# Patient Record
Sex: MALE | Race: WHITE | Employment: OTHER | URBAN - METROPOLITAN AREA
[De-identification: names, ages, dates, MRNs, and addresses within clinical notes are randomized per-mention and may not be internally consistent; named-entity substitution may affect disease eponyms.]

---

## 2017-02-07 ENCOUNTER — OFFICE VISIT (OUTPATIENT)
Dept: FAMILY MEDICINE CLINIC | Age: 77
End: 2017-02-07

## 2017-02-07 VITALS
BODY MASS INDEX: 31.07 KG/M2 | WEIGHT: 205 LBS | DIASTOLIC BLOOD PRESSURE: 75 MMHG | HEIGHT: 68 IN | SYSTOLIC BLOOD PRESSURE: 117 MMHG | HEART RATE: 52 BPM | OXYGEN SATURATION: 97 % | TEMPERATURE: 97.6 F | RESPIRATION RATE: 18 BRPM

## 2017-02-07 DIAGNOSIS — Z13.31 DEPRESSION SCREEN: ICD-10-CM

## 2017-02-07 DIAGNOSIS — G47.33 OSA (OBSTRUCTIVE SLEEP APNEA): ICD-10-CM

## 2017-02-07 DIAGNOSIS — Z00.00 HEALTHCARE MAINTENANCE: Primary | ICD-10-CM

## 2017-02-07 DIAGNOSIS — Z13.220 LIPID SCREENING: ICD-10-CM

## 2017-02-07 DIAGNOSIS — E03.9 HYPOTHYROIDISM, UNSPECIFIED TYPE: ICD-10-CM

## 2017-02-07 DIAGNOSIS — R35.0 FREQUENT URINATION: ICD-10-CM

## 2017-02-07 DIAGNOSIS — Z13.5 GLAUCOMA SCREENING: ICD-10-CM

## 2017-02-07 DIAGNOSIS — Z23 NEED FOR ZOSTAVAX ADMINISTRATION: ICD-10-CM

## 2017-02-07 DIAGNOSIS — N41.9 PROSTATITIS, UNSPECIFIED PROSTATITIS TYPE: ICD-10-CM

## 2017-02-07 PROBLEM — Z98.890 H/O COLONOSCOPY: Status: ACTIVE | Noted: 2017-02-07

## 2017-02-07 LAB
BILIRUB UR QL STRIP: NEGATIVE
GLUCOSE UR-MCNC: NEGATIVE MG/DL
KETONES P FAST UR STRIP-MCNC: NEGATIVE MG/DL
PH UR STRIP: 6.5 [PH] (ref 4.6–8)
PROT UR QL STRIP: NEGATIVE MG/DL
SP GR UR STRIP: 1.02 (ref 1–1.03)
UA UROBILINOGEN AMB POC: NORMAL (ref 0.2–1)
URINALYSIS CLARITY POC: CLEAR
URINALYSIS COLOR POC: YELLOW
URINE BLOOD POC: NEGATIVE
URINE LEUKOCYTES POC: NEGATIVE
URINE NITRITES POC: NEGATIVE

## 2017-02-07 RX ORDER — BISMUTH SUBSALICYLATE 262 MG
1 TABLET,CHEWABLE ORAL DAILY
COMMUNITY
End: 2022-03-22

## 2017-02-07 RX ORDER — ASCORBIC ACID 250 MG
TABLET ORAL
COMMUNITY
End: 2022-03-22

## 2017-02-07 RX ORDER — TAMSULOSIN HYDROCHLORIDE 0.4 MG/1
0.4 CAPSULE ORAL DAILY
Qty: 30 CAP | Refills: 1 | Status: SHIPPED | OUTPATIENT
Start: 2017-02-07 | End: 2017-06-05 | Stop reason: SDUPTHER

## 2017-02-07 RX ORDER — DOXYCYCLINE 100 MG/1
100 TABLET ORAL DAILY
Qty: 14 TAB | Refills: 1 | Status: SHIPPED | OUTPATIENT
Start: 2017-02-07 | End: 2017-02-21

## 2017-02-07 NOTE — PROGRESS NOTES
Doug Ramirez is a 78 y.o. male      Issues discussed today include:        Signs and symptoms:  Nocturia/polyuria. Decreased FOS  Duration:  days  Context:  Known BPH  Location:    Quality:  Sounds like prostatitis  Severity:  No fevers  Timing:  daily  Modifying factors:  Rx Flomax/doxy    Data reviewed or ordered today:  Labs    WELLNESS     PSA:  2017  AAA screen:   Normal     Hearing screen:   done  Vision screening:   done  Depression screening:   done  Fall assessment:   done    Colonoscopy:    FOBT:    TDAP:  2014  Pneumovax:    PCV-13:  Rx   Flu shot:  2017  Zostavax:  Rx 2017  Eye exam:  Needs 2017  EK    FTF for DME:  done:  CPAP (needs update)  Advanced directive:  Full code  Specialists:  [facility]    In general, I advise patients to be as active as possible. I believe exercise is the key to long life and good health. The current recommendation is for individuals to exercise for 150 minutes each week (in other words 30 minutes 5 days a week). Exercise should be vigorous enough to work up a sweat. These activities include brisk walking, running, tennis, swimming, weight-lifting, etc.     I usually tell folks that work is work and exercise is exercise. Each of these activities has a different goal.  Even though you may be active at work, it may not be aerobically adequate. So build dedicated exercise time into your weekly routine. If a patient drinks alcohol, I suggest that a male drink only 2 beers (or glasses of wine, or shots of liquor) in any 24 hour period ( and not daily). For females, the limits are one drink per 24 hours (and not daily). After these limits, the toxic effects of alcohol consumption start to manifest.     Avoid tobacco products. I may provide separate information discussing specific smoking cessation instructions if needed.       I suggest a wellness exam yearly during your birth month to update health maintenance issues such as fasting labs, EKGs and other studies, appropriate cancer screenings,  immunizations, etc.      I suggest a yearly flu shot    Please call Joana Martin to help arrange and authorize any tests or referrals. Her # is [phone]         Other problems include:  Patient Active Problem List   Diagnosis Code    BPH (benign prostatic hypertrophy) N40.0    Hypothyroidism E03.9    Seizure ([facility] [address] R56.9    Meningioma ([facility] [address] D32.9    RACHEL (obstructive sleep apnea) G47.33    H/O colonoscopy Z98.890       Medications:  Current Outpatient Prescriptions   Medication Sig Dispense Refill    ascorbic acid, vitamin C, (VITAMIN C) 250 mg tablet Take  by mouth.  multivitamin (ONE A DAY) tablet Take 1 Tab by mouth daily.  Omega-3 Fatty Acids 60- mg cpDR Take  by mouth.  tamsulosin (FLOMAX) 0.4 mg capsule Take 1 Cap by mouth daily. 30 Cap 1    doxycycline (ADOXA) 100 mg tablet Take 1 Tab by mouth daily for 14 days. 14 Tab 1    clotrimazole-betamethasone (LOTRISONE) topical cream Apply twice daily for 2 weeks 45 g 1    levothyroxine (SYNTHROID) 50 mcg tablet TAKE 1 TABLET BY MOUTH EVERY MORNING AT 7AM 30 Tab 5       Allergies: Allergies   Allergen Reactions    Bactrim [Sulfamethoprim Ds] Seizures    Sulfa (Sulfonamide Antibiotics) Other (comments)     Not sure what happens was taking during time of seizure and other brothers are allergic. LMP:  No LMP for male patient. Social History     Social History    Marital status:      Spouse name: N/A    Number of children: N/A    Years of education: N/A     Occupational History    Not on file.      Social History Main Topics    Smoking status: Former Smoker    Smokeless tobacco: Never Used    Alcohol use No    Drug use: No    Sexual activity: Yes     Partners: Female     Other Topics Concern    Not on file     Social History Narrative         Family History   Problem Relation Age of Onset    Heart Disease Mother     Heart Disease Father Meaningful use:  done      ROS:  Headaches:  no  Chest Pain:  no  SOB:  no  Fevers:  no  Other significant ROS:  Active, no falls/depression, no GI issues, prostatism    No LMP for male patient. Physical Exam  Visit Vitals    /75 (BP 1 Location: Left arm, BP Patient Position: Sitting)    Pulse (!) 52    Temp 97.6 °F (36.4 °C) (Oral)    Resp 18    Ht 5' 8\" (1.727 m)    Wt 205 lb (93 kg)    SpO2 97%    BMI 31.17 kg/m2     BP Readings from Last 3 Encounters:   02/07/17 117/75   09/23/15 120/79   04/30/15 132/85     Constitutional:  Appears well,  No Acute Distress, Vitals noted  Psychiatric:   Affect normal, Alert and cooperative, Oriented to person/place/time    Eyes:   Pupils equally round and reactive, EOMI, conjunctiva clear, eyelids normal  ENT:   External ears and nose normal/lips, teeth=OK/gums normal, TMs and Orophyarynx normal  Neck:   general inspection and Thyroid normal.  No abnormal cervical or supraclavicular nodes    Lungs:   clear to auscultation, good respiratory effort  Heart: Ausculation normal.  Regular rhythm. No cardiac murmurs. No carotid bruits or palpable thrills  Chest wall normal  Abd:  benign  Extremities:   without edema, good peripheral pulses  Skin:   Warm to palpation, without rashes, bruising, or suspicious lesions           Assessment:    Patient Active Problem List   Diagnosis Code    BPH (benign prostatic hypertrophy) N40.0    Hypothyroidism E03.9    Seizure (HCC) R56.9    Meningioma (HCC) D32.9    RACHEL (obstructive sleep apnea) G47.33    H/O colonoscopy Z98.890       Today's diagnoses are:    ICD-10-CM ICD-9-CM    1. Healthcare maintenance Z00.00 V70.0 INFLUENZA VIRUS VAC QUAD,SPLIT,PRESV FREE SYRINGE 3/> YRS IM      [place] IMMUNIZ ADMIN,1 SINGLE/COMB VAC/TOXOID   2. Frequent urination R35.0 788.41 AMB POC URINALYSIS DIP STICK AUTO W/O MICRO      CULTURE, URINE   3. Depression screen Z13.89 V79.0 [place] DEPRESSION SCREEN ANNUAL   4.  Prostatitis, unspecified prostatitis type N41.9 601.9 PSA DIAGNOSTIC (PROSTATIC SPECIFIC AG)      CBC W/O DIFF      METABOLIC PANEL, COMPREHENSIVE      tamsulosin (FLOMAX) 0.4 mg capsule      doxycycline (ADOXA) 100 mg tablet   5. RACHEL (obstructive sleep apnea) G47.33 327.23    6. Hypothyroidism, unspecified type E03.9 244.9 TSH 3RD GENERATION    he is NOT on Rx   7. Lipid screening Z13.220 V77.91 CHOLESTEROL, HDL      CHOLESTEROL, TOTAL      LDL, DIRECT   8. Need for Zostavax administration Z23 V04.89     rx given for PCV 13 and zostavax   9. Glaucoma screening Z13.5 V80.1 REFERRAL TO OPHTHALMOLOGY       Plan:  Orders Placed This Encounter    [place] IMMUNIZ ADMIN,1 SINGLE/COMB VAC/TOXOID    CULTURE, URINE    INFLUENZA VIRUS VAC QUAD,SPLIT,PRESV FREE SYRINGE 3/> YRS IM    TSH 3RD GENERATION    PSA - PROSTATE SPECIFIC AG    CBC W/O DIFF    METABOLIC PANEL, COMPREHENSIVE    CHOLESTEROL, HDL    CHOLESTEROL, TOTAL    LDL, DIRECT    REFERRAL TO OPHTHALMOLOGY     Referral Priority:   Routine     Referral Type:   Consultation     Referral Reason:   Specialty Services Required     Referral Location:   [address] [place] [address] Ophthalmology     Referred to Provider:   Abdiel Mayo DO    AMB POC URINALYSIS DIP STICK AUTO W/O MICRO    [place] DEPRESSION SCREEN ANNUAL    ascorbic acid, vitamin C, (VITAMIN C) 250 mg tablet     Sig: Take  by mouth.  multivitamin (ONE A DAY) tablet     Sig: Take 1 Tab by mouth daily.  Omega-3 Fatty Acids 60- mg cpDR     Sig: Take  by mouth.  tamsulosin (FLOMAX) 0.4 mg capsule     Sig: Take 1 Cap by mouth daily. Dispense:  30 Cap     Refill:  1    doxycycline (ADOXA) 100 mg tablet     Sig: Take 1 Tab by mouth daily for 14 days.      Dispense:  14 Tab     Refill:  1     Take with food       See patient instructions  Patient Instructions   Consider the following health maintenance suggestions:    Medicare Wellness Exam every  November (your birth month)    consider obtaining zostavax (the shingles vaccine) and PCV 13 (the new pneumonia vaccine). You may be given a prescription to obtain this at a local pharmacy    return the stool cards that you were given. Follow the instructions on the cards. This is for colon cancer screening    consider a yearly eye exam including a screen for glaucoma with a local eye doctor    obtain a flu shot each year in the Fall    Please call Rony Dunlap to help arrange and authorize any tests and/or referrals. Her # is [phone] at [facility] is #[phone]    Labs today    Take Doxycycline with food and a big glass of water.   Avoid the sun while on Doxycycline (either cover up or wear SPF 15 or above sun block)    Take flomax to open your prostate (it might make your nose seem stuffy)    You may need to restart thyroid pills                    refresh note:  done    AVS Printed:  done

## 2017-02-07 NOTE — LETTER
2/8/2017 3:54 PM 
 
Mr. Gilberto Dumont [address] [place] [facility] [address] Dear Gilberto Dumont: 
 
Please find your most recent results below. Resulted Orders AMB POC URINALYSIS DIP STICK AUTO W/O MICRO Result Value Ref Range Color (UA POC) Yellow Clarity (UA POC) Clear Glucose (UA POC) Negative Negative Bilirubin (UA POC) Negative Negative Ketones (UA POC) Negative Negative Specific gravity (UA POC) 1.020 1.001 - 1.035 Blood (UA POC) Negative Negative pH (UA POC) 6.5 4.6 - 8.0 Protein (UA POC) Negative Negative mg/dL Urobilinogen (UA POC) 0.2 mg/dL 0.2 - 1 Nitrites (UA POC) Negative Negative Leukocyte esterase (UA POC) Negative Negative CULTURE, URINE Result Value Ref Range Urine Culture, Routine No growth Narrative Performed at:  [address] [place] [address], [facility]  [id number] : Loren Elizabeth MD, Phone:  [phone] TSH 3RD GENERATION Result Value Ref Range TSH 3.150 0.450 - 4.500 uIU/mL Narrative Performed at:  [address]  [id number] : Kaylie Laguna MD, Phone:  [phone] PSA DIAGNOSTIC (PROSTATIC SPECIFIC AG) Result Value Ref Range Prostate Specific Ag 9.7 (H) 0.0 - 4.0 ng/mL Comment:  
   Roche ECLIA methodology. According to the American Urological Association, Serum PSA should 
decrease and remain at undetectable levels after radical 
prostatectomy. The AUA defines biochemical recurrence as an initial 
PSA value 0.2 ng/mL or greater followed by a subsequent confirmatory PSA value 0.2 ng/mL or greater. Values obtained with different assay methods or kits cannot be used 
interchangeably. Results cannot be interpreted as absolute evidence 
of the presence or absence of malignant disease. Narrative Performed at:  [address]  [id number] : Liliana Sosa MD, Phone:  [phone] CBC W/O DIFF Result Value Ref Range WBC 6.8 3.4 - 10.8 x10E3/uL  
 RBC 5.16 4.14 - 5.80 x10E6/uL HGB 15.3 12.6 - 17.7 g/dL HCT 46.0 37.5 - 51.0 % MCV 89 79 - 97 fL  
 MCH 29.7 26.6 - 33.0 pg  
 MCHC 33.3 31.5 - 35.7 g/dL  
 RDW 14.4 12.3 - 15.4 % PLATELET 995 095 - 399 x10E3/uL Narrative Performed at:  [address]  [id number] : Liliana Sosa MD, Phone:  [phone] METABOLIC PANEL, COMPREHENSIVE Result Value Ref Range Glucose 100 (H) 65 - 99 mg/dL BUN 25 8 - 27 mg/dL Creatinine 1.10 0.76 - 1.27 mg/dL GFR est non-AA 66 >59 mL/min/1.73 GFR est AA 76 >59 mL/min/1.73  
 BUN/Creatinine ratio 23 (H) 10 - 22 Sodium 141 134 - 144 mmol/L Potassium 4.7 3.5 - 5.2 mmol/L Chloride 98 96 - 106 mmol/L  
 CO2 23 18 - 29 mmol/L Calcium 9.7 8.6 - 10.2 mg/dL Protein, total 7.1 6.0 - 8.5 g/dL Albumin 4.3 3.5 - 4.8 g/dL GLOBULIN, TOTAL 2.8 1.5 - 4.5 g/dL A-G Ratio 1.5 1.1 - 2.5 Bilirubin, total 0.3 0.0 - 1.2 mg/dL Alk. phosphatase 80 39 - 117 IU/L  
 AST (SGOT) 15 0 - 40 IU/L  
 ALT (SGPT) 11 0 - 44 IU/L Narrative Performed at:  [address]  [id number] : Liliana Sosa MD, Phone:  [phone] CHOLESTEROL, HDL Result Value Ref Range HDL Cholesterol 35 (L) >39 mg/dL Narrative Performed at:  [address]  [id number] : Liliana Sosa MD, Phone:  [phone] CHOLESTEROL, TOTAL Result Value Ref Range Cholesterol, total 267 (H) 100 - 199 mg/dL Narrative Performed at:  [address]  [id number] : Liliana Sosa MD, Phone:  [phone] LDL, DIRECT Result Value Ref Range LDL,Direct 202 (H) 0 - 99 mg/dL Comment Comment Comment: Possible Familial Hypercholesterolemia. FH should be suspected when 
fasting LDL cholesterol is above 189 mg/dL or non-HDL cholesterol 
is above 219 mg/dL. A family history of high cholesterol and heart 
disease in 1st degree relatives should be collected. J Clin Lipidol 4055;9:599-428 Narrative Performed at:  [address]  [id number] : Kingsley Horne MD, Phone:  [phone] CVD REPORT Result Value Ref Range INTERPRETATION Note Comment:  
   Supplement report is available. Narrative Performed at:  [address] [place] [address]  [id number] : Sophia Oneal PhD, Phone:  [phone] Your urine culture was not helpful.  Proceed as we discussed. Sincerely, Warren Allen MD

## 2017-02-07 NOTE — LETTER
2/8/2017 12:07 PM 
 
Mr. Storm Contreras [address] [place] [address] Dear Storm Contreras: 
 
Please find your most recent results below. Resulted Orders AMB POC URINALYSIS DIP STICK AUTO W/O MICRO Result Value Ref Range Color (UA POC) Yellow Clarity (UA POC) Clear Glucose (UA POC) Negative Negative Bilirubin (UA POC) Negative Negative Ketones (UA POC) Negative Negative Specific gravity (UA POC) 1.020 1.001 - 1.035 Blood (UA POC) Negative Negative pH (UA POC) 6.5 4.6 - 8.0 Protein (UA POC) Negative Negative mg/dL Urobilinogen (UA POC) 0.2 mg/dL 0.2 - 1 Nitrites (UA POC) Negative Negative Leukocyte esterase (UA POC) Negative Negative TSH 3RD GENERATION Result Value Ref Range TSH 3.150 0.450 - 4.500 uIU/mL Narrative Performed at:  [address]  [id number] : Brandy Hope MD, Phone:  [phone] PSA DIAGNOSTIC (PROSTATIC SPECIFIC AG) Result Value Ref Range Prostate Specific Ag 9.7 (H) 0.0 - 4.0 ng/mL Comment:  
   Roche ECLIA methodology. According to the American Urological Association, Serum PSA should 
decrease and remain at undetectable levels after radical 
prostatectomy. The AUA defines biochemical recurrence as an initial 
PSA value 0.2 ng/mL or greater followed by a subsequent confirmatory PSA value 0.2 ng/mL or greater. Values obtained with different assay methods or kits cannot be used 
interchangeably. Results cannot be interpreted as absolute evidence 
of the presence or absence of malignant disease. Narrative Performed at:  [address]  [id number] : Brandy Hope MD, Phone:  [phone] CBC W/O DIFF Result Value Ref Range WBC 6.8 3.4 - 10.8 x10E3/uL  
 RBC 5.16 4.14 - 5.80 x10E6/uL HGB 15.3 12.6 - 17.7 g/dL HCT 46.0 37.5 - 51.0 %  MCV 89 79 - 97 fL  
 MCH 29.7 26.6 - 33.0 pg  
 MCHC 33.3 31.5 - 35.7 g/dL  
 RDW 14.4 12.3 - 15.4 % PLATELET 541 999 - 630 x10E3/uL Narrative Performed at:  [address]  [id number] : Kaylie Laguna MD, Phone:  [phone] METABOLIC PANEL, COMPREHENSIVE Result Value Ref Range Glucose 100 (H) 65 - 99 mg/dL BUN 25 8 - 27 mg/dL Creatinine 1.10 0.76 - 1.27 mg/dL GFR est non-AA 66 >59 mL/min/1.73 GFR est AA 76 >59 mL/min/1.73  
 BUN/Creatinine ratio 23 (H) 10 - 22 Sodium 141 134 - 144 mmol/L Potassium 4.7 3.5 - 5.2 mmol/L Chloride 98 96 - 106 mmol/L  
 CO2 23 18 - 29 mmol/L Calcium 9.7 8.6 - 10.2 mg/dL Protein, total 7.1 6.0 - 8.5 g/dL Albumin 4.3 3.5 - 4.8 g/dL GLOBULIN, TOTAL 2.8 1.5 - 4.5 g/dL A-G Ratio 1.5 1.1 - 2.5 Bilirubin, total 0.3 0.0 - 1.2 mg/dL Alk. phosphatase 80 39 - 117 IU/L  
 AST (SGOT) 15 0 - 40 IU/L  
 ALT (SGPT) 11 0 - 44 IU/L Narrative Performed at:  [address]  [id number] : Kaylie Laguna MD, Phone:  [phone] CHOLESTEROL, HDL Result Value Ref Range HDL Cholesterol 35 (L) >39 mg/dL Narrative Performed at:  [address]  [id number] : Kaylie Laguna MD, Phone:  [phone] CHOLESTEROL, TOTAL Result Value Ref Range Cholesterol, total 267 (H) 100 - 199 mg/dL Narrative Performed at:  [address]  [id number] : Kaylie Laguna MD, Phone:  [phone] LDL, DIRECT Result Value Ref Range LDL,Direct 202 (H) 0 - 99 mg/dL Comment Comment Comment:  
   Possible Familial Hypercholesterolemia. FH should be suspected when 
fasting LDL cholesterol is above 189 mg/dL or non-HDL cholesterol 
is above 219 mg/dL. A family history of high cholesterol and heart disease in 1st degree relatives should be collected. J Clin Lipidol 8093;7:729-414 Narrative Performed at:  [address]  [id number] : Kwasi Adams MD, Phone:  [phone] CVD REPORT Result Value Ref Range INTERPRETATION Note Comment:  
   Supplement report is available. Narrative Performed at:  [address] [place] [address]  [id number] : John Bradley PhD, Phone:  [phone] RECOMMENDATIONS: 
 
Your cholesterol is up.  You have a 25.1 % chance of developing heart disease based on your current risks.  Because of this I suggest:  crestor 5 mg You PSA is up.  This could be from infection.  Let's recheck labs in 6 weeks Your thyroid is stable on no thyroid medicine.  You may stay off your Rx for nowand recheck labs in 6 weeks. Sincerely, Paula Arenas MD

## 2017-02-07 NOTE — PROGRESS NOTES
Chief Complaint   Patient presents with    Urinary Frequency     urine frequency x 1 year      1. Have you been to the ER, urgent care clinic since your last visit? Hospitalized since your last visit? No    2. Have you seen or consulted any other health care providers outside of the [facility] since your last visit? Include any pap smears or colon screening. No     Reviewed record in preparation for visit and have obtained necessary documentation.

## 2017-02-07 NOTE — PATIENT INSTRUCTIONS
Consider the following health maintenance suggestions:    Medicare Wellness Exam every  November (your birth month)    consider obtaining zostavax (the shingles vaccine) and PCV 13 (the new pneumonia vaccine). You may be given a prescription to obtain this at a local pharmacy    return the stool cards that you were given. Follow the instructions on the cards. This is for colon cancer screening    consider a yearly eye exam including a screen for glaucoma with a local eye doctor    obtain a flu shot each year in the Fall    Please call Alisha Mayfield to help arrange and authorize any tests and/or referrals. Her # is [phone] [phone]    Labs today    Take Doxycycline with food and a big glass of water.   Avoid the sun while on Doxycycline (either cover up or wear SPF 15 or above sun block)    Take flomax to open your prostate (it might make your nose seem stuffy)    You may need to restart thyroid pills

## 2017-02-07 NOTE — MR AVS SNAPSHOT
Visit Information Date & Time Provider Department Dept. Phone Encounter #  
 2/7/2017  2:30 PM Talat Santiago MD [address] [place] [address] [phone] [id number] Upcoming Health Maintenance Date Due FOBT Q 1 YEAR AGE 50-75 2/7/2018 MEDICARE YEARLY EXAM 2/8/2018 GLAUCOMA SCREENING Q2Y 2/7/2019 DTaP/Tdap/Td series (2 - Td) 1/21/2024 Allergies as of 2/7/2017  Review Complete On: 2/7/2017 By: Talat Santiago MD  
  
 Severity Noted Reaction Type Reactions Bactrim [Sulfamethoprim Ds]  09/02/2014    Seizures Sulfa (Sulfonamide Antibiotics)  04/30/2015    Other (comments) Not sure what happens was taking during time of seizure and other brothers are allergic. Current Immunizations  Reviewed on 1/21/2014 Name Date Pneumococcal Polysaccharide (PPSV-23) 1/21/2014 Tdap 1/21/2014 Not reviewed this visit You Were Diagnosed With   
  
 Codes Comments Healthcare maintenance    -  Primary ICD-10-CM: Z00.00 ICD-9-CM: V70.0 Frequent urination     ICD-10-CM: R35.0 ICD-9-CM: 788.41 Depression screen     ICD-10-CM: Z13.89 ICD-9-CM: V79.0 Prostatitis, unspecified prostatitis type     ICD-10-CM: N41.9 ICD-9-CM: 601.9 RACHEL (obstructive sleep apnea)     ICD-10-CM: G47.33 
ICD-9-CM: 327.23 Hypothyroidism, unspecified type     ICD-10-CM: E03.9 ICD-9-CM: 244.9 he is NOT on Rx Lipid screening     ICD-10-CM: S09.426 ICD-9-CM: V77.91 Need for Zostavax administration     ICD-10-CM: L81 ICD-9-CM: V04.89 rx given for PCV 13 and zostavax Glaucoma screening     ICD-10-CM: Z13.5 ICD-9-CM: V80.1 Vitals BP Pulse Temp Resp Height(growth percentile) Weight(growth percentile) 117/75 (BP 1 Location: Left arm, BP Patient Position: Sitting) (!) 52 97.6 °F (36.4 °C) (Oral) 18 5' 8\" (1.727 m) 205 lb (93 kg) SpO2 BMI Smoking Status 97% 31.17 kg/m2 Former Smoker Vitals History BMI and BSA Data Body Mass Index Body Surface Area  
 31.17 kg/m 2 2.11 m 2 Preferred Pharmacy Pharmacy Name Phone [facility] DRUG STORE [facility] [address] AT [facility] [phone] Your Updated Medication List  
  
   
This list is accurate as of: 2/7/17  3:12 PM.  Always use your most recent med list.  
  
  
  
  
 clotrimazole-betamethasone topical cream  
Commonly known as:  Alphonsa Spies Apply twice daily for 2 weeks  
  
 doxycycline 100 mg tablet Commonly known as:  ADOXA Take 1 Tab by mouth daily for 14 days. levothyroxine 50 mcg tablet Commonly known as:  SYNTHROID  
TAKE 1 TABLET BY MOUTH EVERY MORNING AT 7AM  
  
 multivitamin tablet Commonly known as:  ONE A DAY Take 1 Tab by mouth daily. Omega-3 Fatty Acids 60- mg Cpdr  
Take  by mouth. tamsulosin 0.4 mg capsule Commonly known as:  FLOMAX Take 1 Cap by mouth daily. VITAMIN C 250 mg tablet Generic drug:  ascorbic acid (vitamin C) Take  by mouth. Prescriptions Printed Refills  
 tamsulosin (FLOMAX) 0.4 mg capsule 1 Sig: Take 1 Cap by mouth daily. Class: Print Route: Oral  
 doxycycline (ADOXA) 100 mg tablet 1 Sig: Take 1 Tab by mouth daily for 14 days. Class: Print Route: Oral  
  
We Performed the Following AMB POC URINALYSIS DIP STICK AUTO W/O MICRO [77402 CPT(R)] CBC W/O DIFF [00996 CPT(R)] CHOLESTEROL, HDL [id number] CPT(R)] CHOLESTEROL, TOTAL [41327 CPT(R)] CULTURE, URINE [id number] CPT(R)] LDL, DIRECT [id number] CPT(R)] METABOLIC PANEL, COMPREHENSIVE [86925 CPT(R)] [address] [employer] [ [facility]] PSA DIAGNOSTIC (PROSTATIC SPECIFIC AG) [id number] CPT(R)] REFERRAL TO OPHTHALMOLOGY [REF57 Custom] Comments:  
 Please evaluate patient for glaucoma screening. Dr. Baker Click #[phone] TSH 3RD GENERATION [09341 CPT(R)] Referral Information Referral ID Referred By Referred To [id number] [facility] [address] [facility] Dasha Obregon 33 Visits Status Start Date End Date 1 New Request 2/7/17 2/7/18 If your referral has a status of pending review or denied, additional information will be sent to support the outcome of this decision. Patient Instructions Consider the following health maintenance suggestions: 
 
Medicare Wellness Exam every  November (your birth month) 
 
consider obtaining zostavax (the shingles vaccine) and PCV 13 (the new pneumonia vaccine). You may be given a prescription to obtain this at a local pharmacy 
 
return the stool cards that you were given. Follow the instructions on the cards. This is for colon cancer screening 
 
consider a yearly eye exam including a screen for glaucoma with a local eye doctor 
 
obtain a flu shot each year in the Fall Please call Darci Álvarez to help arrange and authorize any tests and/or referrals. Her # is [phone] Central Scheduling at [place] [facility] is #[phone] Labs today Take Doxycycline with food and a big glass of water. Avoid the sun while on Doxycycline (either cover up or wear SPF 15 or above sun block) Take flomax to open your prostate (it might make your nose seem stuffy) Introducing [address]! Dear Russar Jovi: Thank you for requesting a [employer] account. Our records indicate that you already have an active [employer] account. You can access your account anytime at [email or web address] Did you know that you can access your hospital and ER discharge instructions at any time in [employer]? You can also review all of your test results from your hospital stay or ER visit. Additional Information If you have questions, please visit the Frequently Asked Questions section of the [employer] website at [email or web address]/. Remember, MyChart is NOT to be used for urgent needs. For medical emergencies, dial 911. Now available from your iPhone and Android! Please provide this summary of care documentation to your next provider. Your primary care clinician is listed as Manisha Farnsworth. If you have any questions after today's visit, please call [phone].

## 2017-02-08 DIAGNOSIS — E78.9 LIPID DISORDER: Primary | ICD-10-CM

## 2017-02-08 DIAGNOSIS — R97.20 ELEVATED PSA, LESS THAN 10 NG/ML: ICD-10-CM

## 2017-02-08 DIAGNOSIS — E07.9 THYROID CONDITION: ICD-10-CM

## 2017-02-08 LAB
ALBUMIN SERPL-MCNC: 4.3 G/DL (ref 3.5–4.8)
ALBUMIN/GLOB SERPL: 1.5 {RATIO} (ref 1.1–2.5)
ALP SERPL-CCNC: 80 IU/L (ref 39–117)
ALT SERPL-CCNC: 11 IU/L (ref 0–44)
AST SERPL-CCNC: 15 IU/L (ref 0–40)
BACTERIA UR CULT: NO GROWTH
BILIRUB SERPL-MCNC: 0.3 MG/DL (ref 0–1.2)
BUN SERPL-MCNC: 25 MG/DL (ref 8–27)
BUN/CREAT SERPL: 23 (ref 10–22)
CALCIUM SERPL-MCNC: 9.7 MG/DL (ref 8.6–10.2)
CHLORIDE SERPL-SCNC: 98 MMOL/L (ref 96–106)
CHOLEST SERPL-MCNC: 267 MG/DL (ref 100–199)
CO2 SERPL-SCNC: 23 MMOL/L (ref 18–29)
COMMENT, 011824: ABNORMAL
CREAT SERPL-MCNC: 1.1 MG/DL (ref 0.76–1.27)
ERYTHROCYTE [DISTWIDTH] IN BLOOD BY AUTOMATED COUNT: 14.4 % (ref 12.3–15.4)
GLOBULIN SER CALC-MCNC: 2.8 G/DL (ref 1.5–4.5)
GLUCOSE SERPL-MCNC: 100 MG/DL (ref 65–99)
HCT VFR BLD AUTO: 46 % (ref 37.5–51)
HDLC SERPL-MCNC: 35 MG/DL
HGB BLD-MCNC: 15.3 G/DL (ref 12.6–17.7)
INTERPRETATION, 910389: NORMAL
LDLC SERPL DIRECT ASSAY-MCNC: 202 MG/DL (ref 0–99)
MCH RBC QN AUTO: 29.7 PG (ref 26.6–33)
MCHC RBC AUTO-ENTMCNC: 33.3 G/DL (ref 31.5–35.7)
MCV RBC AUTO: 89 FL (ref 79–97)
PLATELET # BLD AUTO: 234 X10E3/UL (ref 150–379)
POTASSIUM SERPL-SCNC: 4.7 MMOL/L (ref 3.5–5.2)
PROT SERPL-MCNC: 7.1 G/DL (ref 6–8.5)
PSA SERPL-MCNC: 9.7 NG/ML (ref 0–4)
RBC # BLD AUTO: 5.16 X10E6/UL (ref 4.14–5.8)
SODIUM SERPL-SCNC: 141 MMOL/L (ref 134–144)
TSH SERPL DL<=0.005 MIU/L-ACNC: 3.15 UIU/ML (ref 0.45–4.5)
WBC # BLD AUTO: 6.8 X10E3/UL (ref 3.4–10.8)

## 2017-02-08 RX ORDER — ROSUVASTATIN CALCIUM 5 MG/1
5 TABLET, COATED ORAL
Qty: 30 TAB | Refills: 3 | Status: SHIPPED | OUTPATIENT
Start: 2017-02-08 | End: 2018-02-21 | Stop reason: SDUPTHER

## 2017-02-08 NOTE — PROGRESS NOTES
Your cholesterol is up. You have a 25.1 % chance of developing heart disease based on your current risks. Because of this I suggest:  crestor 5 mg    You PSA is up. This could be from infection. Let's recheck labs in 6 weeks    Your thyroid is stable on no thyroid medicine. You may stay off your Rx for nowand recheck labs in 6 weeks.

## 2017-06-05 ENCOUNTER — OFFICE VISIT (OUTPATIENT)
Dept: FAMILY MEDICINE CLINIC | Age: 77
End: 2017-06-05

## 2017-06-05 VITALS
TEMPERATURE: 98.1 F | RESPIRATION RATE: 18 BRPM | SYSTOLIC BLOOD PRESSURE: 129 MMHG | HEART RATE: 55 BPM | OXYGEN SATURATION: 97 % | WEIGHT: 208 LBS | BODY MASS INDEX: 31.52 KG/M2 | DIASTOLIC BLOOD PRESSURE: 77 MMHG | HEIGHT: 68 IN

## 2017-06-05 DIAGNOSIS — N41.9 PROSTATITIS, UNSPECIFIED PROSTATITIS TYPE: ICD-10-CM

## 2017-06-05 RX ORDER — DOXYCYCLINE 100 MG/1
100 TABLET ORAL DAILY
Qty: 30 TAB | Refills: 0 | Status: SHIPPED | OUTPATIENT
Start: 2017-06-05 | End: 2017-07-05

## 2017-06-05 RX ORDER — TAMSULOSIN HYDROCHLORIDE 0.4 MG/1
0.4 CAPSULE ORAL DAILY
Qty: 30 CAP | Refills: 1 | Status: SHIPPED | OUTPATIENT
Start: 2017-06-05 | End: 2018-02-21 | Stop reason: SDUPTHER

## 2017-06-05 NOTE — PROGRESS NOTES
The majority of today's visit was counseling or coordination of care 15 minutes for the following reasons:        See diagnoses and orders, see patient instructions    See previous notes. His BPH symptoms were improving while on rx but came back when his Rx ended.   Will cory Preciado and FLomax    We discussed use and side effects of both Rx    See instructions

## 2017-06-05 NOTE — PATIENT INSTRUCTIONS
Take Doxycycline with food and a big glass of water.   Avoid the sun while on Doxycycline (either cover up or wear SPF 15 or above sun block)    Take flomax daily (this may make your nose stuffy)

## 2017-06-05 NOTE — MR AVS SNAPSHOT
Visit Information Date & Time Provider Department Dept. Phone Encounter #  
 6/5/2017  6:30 PM Denver Cumins, MD [facility] [phone] [id number] Your Appointments 6/5/2017  6:30 PM  
ACUTE CARE with Denver Cumins, MD  
[address] [facility]) Appt Note: urinary issues [address] [facility] [address] [place] [id number]  
  
   
 [address] [place] [address] 3 [facility] [phone] Upcoming Health Maintenance Date Due INFLUENZA AGE 9 TO ADULT 8/1/2017 FOBT Q 1 YEAR AGE 50-75 2/7/2018 MEDICARE YEARLY EXAM 2/8/2018 GLAUCOMA SCREENING Q2Y 2/7/2019 DTaP/Tdap/Td series (2 - Td) 1/21/2024 Allergies as of 6/5/2017  Review Complete On: 6/5/2017 By: Denver Cumins, MD  
  
 Severity Noted Reaction Type Reactions Bactrim [Sulfamethoprim Ds]  09/02/2014    Seizures Sulfa (Sulfonamide Antibiotics)  04/30/2015    Other (comments) Not sure what happens was taking during time of seizure and other brothers are allergic. Current Immunizations  Reviewed on 2/7/2017 Name Date Influenza Vaccine (Quad) PF 2/7/2017 Pneumococcal Polysaccharide (PPSV-23) 1/21/2014 Tdap 1/21/2014 Not reviewed this visit You Were Diagnosed With   
  
 Codes Comments Prostatitis, unspecified prostatitis type     ICD-10-CM: N41.9 ICD-9-CM: 601.9 Vitals BP Pulse Temp Resp Height(growth percentile) Weight(growth percentile) 129/77 (BP 1 Location: Right arm, BP Patient Position: Sitting) (!) 55 98.1 °F (36.7 °C) (Oral) 18 5' 8\" (1.727 m) 208 lb (94.3 kg) SpO2 BMI Smoking Status 97% 31.63 kg/m2 Former Smoker Vitals History BMI and BSA Data Body Mass Index Body Surface Area  
 31.63 kg/m 2 2.13 m 2 Preferred Pharmacy Pharmacy Name Phone [facility] DRUG STORE [place], [address] AT [facility] [phone] Your Updated Medication List  
  
   
This list is accurate as of: 6/5/17  6:29 PM.  Always use your most recent med list.  
  
  
  
  
 clotrimazole-betamethasone topical cream  
Commonly known as:  Brown Desirae Apply twice daily for 2 weeks  
  
 doxycycline 100 mg tablet Commonly known as:  ADOXA Take 1 Tab by mouth daily for 30 days. levothyroxine 50 mcg tablet Commonly known as:  SYNTHROID  
TAKE 1 TABLET BY MOUTH EVERY MORNING AT 7AM  
  
 multivitamin tablet Commonly known as:  ONE A DAY Take 1 Tab by mouth daily. rosuvastatin 5 mg tablet Commonly known as:  CRESTOR Take 1 Tab by mouth nightly. tamsulosin 0.4 mg capsule Commonly known as:  FLOMAX Take 1 Cap by mouth daily. VITAMIN C 250 mg tablet Generic drug:  ascorbic acid (vitamin C) Take  by mouth. Prescriptions Printed Refills  
 doxycycline (ADOXA) 100 mg tablet 0 Sig: Take 1 Tab by mouth daily for 30 days. Class: Print Route: Oral  
 tamsulosin (FLOMAX) 0.4 mg capsule 1 Sig: Take 1 Cap by mouth daily. Class: Print Route: Oral  
  
Patient Instructions Take Doxycycline with food and a big glass of water. Avoid the sun while on Doxycycline (either cover up or wear SPF 15 or above sun block) Take flomax daily (this may make your nose stuffy) Introducing [facility]! Dear Terrance Calle: Thank you for requesting a [employer] account. Our records indicate that you already have an active [employer] account. You can access your account anytime at [email or web address] [email or web address] Did you know that you can access your hospital and ER discharge instructions at any time in Inv[employer]? You can also review all of your test results from your hospital stay or ER visit. Additional Information If you have questions, please visit the Frequently Asked Questions section of the [employer] website at [email or web address] [email or web address]/. Remember, MyChart is NOT to be used for urgent needs. For medical emergencies, dial 911. Now available from your iPhone and Android! Please provide this summary of care documentation to your next provider. Your primary care clinician is listed as Zeynep Cardenas. If you have any questions after today's visit, please call [phone].

## 2017-06-06 ENCOUNTER — HOSPITAL ENCOUNTER (EMERGENCY)
Age: 77
Discharge: HOME | End: 2017-06-06
Attending: EMERGENCY MEDICINE
Payer: MEDICARE

## 2017-06-06 VITALS
HEART RATE: 65 BPM | WEIGHT: 208 LBS | TEMPERATURE: 97.3 F | DIASTOLIC BLOOD PRESSURE: 97 MMHG | SYSTOLIC BLOOD PRESSURE: 138 MMHG | HEIGHT: 69 IN | RESPIRATION RATE: 21 BRPM | BODY MASS INDEX: 30.81 KG/M2 | OXYGEN SATURATION: 91 %

## 2017-06-06 DIAGNOSIS — E86.0 DEHYDRATION: Primary | ICD-10-CM

## 2017-06-06 LAB
ALBUMIN SERPL BCP-MCNC: 3.8 G/DL (ref 3.5–5)
ALBUMIN/GLOB SERPL: 1.2 {RATIO} (ref 1.1–2.2)
ALP SERPL-CCNC: 66 U/L (ref 45–117)
ALT SERPL-CCNC: 18 U/L (ref 12–78)
ANION GAP BLD CALC-SCNC: 8 MMOL/L (ref 5–15)
APPEARANCE UR: ABNORMAL
AST SERPL W P-5'-P-CCNC: 18 U/L (ref 15–37)
BACTERIA URNS QL MICRO: NEGATIVE /HPF
BASOPHILS # BLD AUTO: 0 K/UL (ref 0–0.1)
BASOPHILS # BLD: 0 % (ref 0–1)
BILIRUB SERPL-MCNC: 0.5 MG/DL (ref 0.2–1)
BILIRUB UR QL CFM: NEGATIVE
BUN SERPL-MCNC: 24 MG/DL (ref 6–20)
BUN/CREAT SERPL: 17 (ref 12–20)
CALCIUM SERPL-MCNC: 8.8 MG/DL (ref 8.5–10.1)
CHLORIDE SERPL-SCNC: 105 MMOL/L (ref 97–108)
CO2 SERPL-SCNC: 26 MMOL/L (ref 21–32)
COLOR UR: ABNORMAL
CREAT SERPL-MCNC: 1.4 MG/DL (ref 0.7–1.3)
EOSINOPHIL # BLD: 0.2 K/UL (ref 0–0.4)
EOSINOPHIL NFR BLD: 3 % (ref 0–7)
EPITH CASTS URNS QL MICRO: ABNORMAL /LPF
ERYTHROCYTE [DISTWIDTH] IN BLOOD BY AUTOMATED COUNT: 13.8 % (ref 11.5–14.5)
GLOBULIN SER CALC-MCNC: 3.2 G/DL (ref 2–4)
GLUCOSE SERPL-MCNC: 135 MG/DL (ref 65–100)
GLUCOSE UR STRIP.AUTO-MCNC: NEGATIVE MG/DL
HCT VFR BLD AUTO: 41.3 % (ref 36.6–50.3)
HGB BLD-MCNC: 14.1 G/DL (ref 12.1–17)
HGB UR QL STRIP: NEGATIVE
HYALINE CASTS URNS QL MICRO: ABNORMAL /LPF (ref 0–5)
KETONES UR QL STRIP.AUTO: ABNORMAL MG/DL
LEUKOCYTE ESTERASE UR QL STRIP.AUTO: NEGATIVE
LYMPHOCYTES # BLD AUTO: 12 % (ref 12–49)
LYMPHOCYTES # BLD: 0.9 K/UL (ref 0.8–3.5)
MCH RBC QN AUTO: 29.9 PG (ref 26–34)
MCHC RBC AUTO-ENTMCNC: 34.1 G/DL (ref 30–36.5)
MCV RBC AUTO: 87.5 FL (ref 80–99)
MONOCYTES # BLD: 0.5 K/UL (ref 0–1)
MONOCYTES NFR BLD AUTO: 7 % (ref 5–13)
NEUTS SEG # BLD: 5.8 K/UL (ref 1.8–8)
NEUTS SEG NFR BLD AUTO: 78 % (ref 32–75)
NITRITE UR QL STRIP.AUTO: NEGATIVE
PH UR STRIP: 6 [PH] (ref 5–8)
PLATELET # BLD AUTO: 189 K/UL (ref 150–400)
POTASSIUM SERPL-SCNC: 4.1 MMOL/L (ref 3.5–5.1)
PROT SERPL-MCNC: 7 G/DL (ref 6.4–8.2)
PROT UR STRIP-MCNC: 30 MG/DL
RBC # BLD AUTO: 4.72 M/UL (ref 4.1–5.7)
RBC #/AREA URNS HPF: ABNORMAL /HPF (ref 0–5)
SODIUM SERPL-SCNC: 139 MMOL/L (ref 136–145)
SP GR UR REFRACTOMETRY: 1.03 (ref 1–1.03)
TROPONIN I SERPL-MCNC: <0.04 NG/ML
UROBILINOGEN UR QL STRIP.AUTO: 0.2 EU/DL (ref 0.2–1)
WBC # BLD AUTO: 7.4 K/UL (ref 4.1–11.1)
WBC URNS QL MICRO: ABNORMAL /HPF (ref 0–4)

## 2017-06-06 PROCEDURE — 99285 EMERGENCY DEPT VISIT HI MDM: CPT

## 2017-06-06 PROCEDURE — 36415 COLL VENOUS BLD VENIPUNCTURE: CPT | Performed by: EMERGENCY MEDICINE

## 2017-06-06 PROCEDURE — 74011250636 HC RX REV CODE- 250/636: Performed by: EMERGENCY MEDICINE

## 2017-06-06 PROCEDURE — 96360 HYDRATION IV INFUSION INIT: CPT

## 2017-06-06 PROCEDURE — 80053 COMPREHEN METABOLIC PANEL: CPT | Performed by: EMERGENCY MEDICINE

## 2017-06-06 PROCEDURE — 81001 URINALYSIS AUTO W/SCOPE: CPT | Performed by: EMERGENCY MEDICINE

## 2017-06-06 PROCEDURE — 85025 COMPLETE CBC W/AUTO DIFF WBC: CPT | Performed by: EMERGENCY MEDICINE

## 2017-06-06 PROCEDURE — 84484 ASSAY OF TROPONIN QUANT: CPT | Performed by: EMERGENCY MEDICINE

## 2017-06-06 PROCEDURE — 93005 ELECTROCARDIOGRAM TRACING: CPT

## 2017-06-06 RX ORDER — SODIUM CHLORIDE 0.9 % (FLUSH) 0.9 %
5-10 SYRINGE (ML) INJECTION EVERY 8 HOURS
Status: DISCONTINUED | OUTPATIENT
Start: 2017-06-06 | End: 2017-06-07 | Stop reason: HOSPADM

## 2017-06-06 RX ORDER — SODIUM CHLORIDE 0.9 % (FLUSH) 0.9 %
5-10 SYRINGE (ML) INJECTION AS NEEDED
Status: DISCONTINUED | OUTPATIENT
Start: 2017-06-06 | End: 2017-06-07 | Stop reason: HOSPADM

## 2017-06-06 RX ADMIN — SODIUM CHLORIDE 1000 ML: 900 INJECTION, SOLUTION INTRAVENOUS at 19:07

## 2017-06-06 NOTE — ED PROVIDER NOTES
HPI Comments: 78 y.o. male with past medical history significant for seizures and sleep apnea who presents to the ED with chief complaint of generalized weakness. Pt reports generalized weakness accompanied by fatigue and dizziness onset today after working outside in the heat, says he feels \"off balance. \" Pt states he had not eaten or had much fluid intake since breakfast at the time of the onset of his sx. Pt states he was started on antibiotics yesterday to treat a UTI. Pt denies syncope, abdominal pain, hematuria, blood in stool, chest pain, or SOB. There are no other acute medical complaints voiced at this time. Social Hx: . PCP: Wayne Torres MD    Note written by Isaias George, as dictated by Nestor Cuevas MD 6:43 PM     The history is provided by the patient and the spouse. Past Medical History:   Diagnosis Date    Seizures ([address]     Sleep apnea 2008       Past Surgical History:   Procedure Laterality Date    KNEE ARTHROSCP HARV      right          Family History:   Problem Relation Age of Onset    Heart Disease Mother     Heart Disease Father        Social History     Social History    Marital status:      Spouse name: N/A    Number of children: N/A    Years of education: N/A     Occupational History    Not on file. Social History Main Topics    Smoking status: Former Smoker    Smokeless tobacco: Never Used    Alcohol use No    Drug use: No    Sexual activity: Yes     Partners: Female     Other Topics Concern    Not on file     Social History Narrative         ALLERGIES: Bactrim [sulfamethoprim ds] and Sulfa (sulfonamide antibiotics)    Review of Systems   Constitutional: Positive for fatigue. Negative for appetite change, fever and unexpected weight change. HENT: Negative. Negative for ear pain, hearing loss, nosebleeds, rhinorrhea, sore throat and trouble swallowing. Respiratory: Negative.   Negative for cough, chest tightness and shortness of breath. Cardiovascular: Negative. Negative for chest pain and palpitations. Gastrointestinal: Negative. Negative for abdominal distention, abdominal pain, blood in stool and vomiting. Endocrine: Negative. Genitourinary: Negative for dysuria and hematuria. Musculoskeletal: Negative. Negative for back pain and myalgias. Skin: Negative. Negative for rash. Allergic/Immunologic: Negative. Neurological: Positive for dizziness and weakness. Negative for syncope and numbness. Hematological: Negative. Psychiatric/Behavioral: Negative. All other systems reviewed and are negative. Vitals:    06/06/17 1827 06/06/17 1839 06/06/17 1840   BP: (!) 82/53 95/68    Pulse: 70     Resp: 16     Temp: 97.3 °F (36.3 °C)     SpO2: 94%  96%   Weight: 94.3 kg (208 lb)     Height: 5' 9\" (1.753 m)              Physical Exam   Constitutional: He is oriented to person, place, and time. He appears well-developed and well-nourished. He appears distressed. Nontoxic appearing. Slightly pale. Minimal acute distress. HENT:   Head: Normocephalic and atraumatic. Right Ear: External ear normal.   Left Ear: External ear normal.   Nose: Nose normal.   Mouth/Throat: Oropharynx is clear and moist.   Eyes: Conjunctivae and EOM are normal. Pupils are equal, round, and reactive to light. Neck: Normal range of motion. Neck supple. No JVD present. No thyromegaly present. Cardiovascular: Normal rate, regular rhythm, normal heart sounds and intact distal pulses. No murmur heard. Pulmonary/Chest: Effort normal and breath sounds normal. No respiratory distress. He has no wheezes. He has no rales. Abdominal: Soft. Bowel sounds are normal. He exhibits no distension. There is no tenderness. Musculoskeletal: Normal range of motion. He exhibits no edema. Neurological: He is alert and oriented to person, place, and time. No cranial nerve deficit. Skin: Skin is warm and dry. No rash noted.  There is pallor. Psychiatric: He has a normal mood and affect. His behavior is normal. Thought content normal.   Nursing note and vitals reviewed. Note written by Isaias Regalado, as dictated by Kirt Pino MD 6:44 PM    [facility]  ED Course       Procedures    PROGRESS NOTE:  10:00 PM   Urinalysis shows no signs of infection. Trace ketones. CMP unremarkable. CBC unremarkable. Troponin negative. Blood pressure improved with IV fluids. Will discharge home with PCP f/u in a few days.

## 2017-06-06 NOTE — ED NOTES
Bedside shift change report given to celso suazo (oncoming nurse) by celso burrows (offgoing nurse). Report included the following information SBAR.

## 2017-06-07 LAB
ATRIAL RATE: 69 BPM
CALCULATED P AXIS, ECG09: -5 DEGREES
CALCULATED R AXIS, ECG10: -39 DEGREES
CALCULATED T AXIS, ECG11: 10 DEGREES
DIAGNOSIS, 93000: NORMAL
P-R INTERVAL, ECG05: 156 MS
Q-T INTERVAL, ECG07: 402 MS
QRS DURATION, ECG06: 92 MS
QTC CALCULATION (BEZET), ECG08: 430 MS
VENTRICULAR RATE, ECG03: 69 BPM

## 2017-06-07 NOTE — DISCHARGE INSTRUCTIONS
Dehydration: Care Instructions  Your Care Instructions  Dehydration happens when your body loses too much fluid. This might happen when you do not drink enough water or you lose large amounts of fluids from your body because of diarrhea, vomiting, or sweating. Severe dehydration can be life-threatening. Water and minerals called electrolytes help put your body fluids back in balance. Learn the early signs of fluid loss, and drink more fluids to prevent dehydration. Follow-up care is a key part of your treatment and safety. Be sure to make and go to all appointments, and call your doctor if you are having problems. It's also a good idea to know your test results and keep a list of the medicines you take. How can you care for yourself at home? · To prevent dehydration, drink plenty of fluids, enough so that your urine is light yellow or clear like water. Choose water and other caffeine-free clear liquids until you feel better. If you have kidney, heart, or liver disease and have to limit fluids, talk with your doctor before you increase the amount of fluids you drink. · If you do not feel like eating or drinking, try taking small sips of water, sports drinks, or other rehydration drinks. · Get plenty of rest.  To prevent dehydration  · Add more fluids to your diet and daily routine, unless your doctor has told you not to. · During hot weather, drink more fluids. Drink even more fluids if you exercise a lot. Stay away from drinks with alcohol or caffeine. · Watch for the symptoms of dehydration. These include:  ¨ A dry, sticky mouth. ¨ Dark yellow urine, and not much of it. ¨ Dry and sunken eyes. ¨ Feeling very tired. · Learn what problems can lead to dehydration. These include:  ¨ Diarrhea, fever, and vomiting. ¨ Any illness with a fever, such as pneumonia or the flu. ¨ Activities that cause heavy sweating, such as endurance races and heavy outdoor work in hot or humid weather.   ¨ Alcohol or drug abuse or withdrawal.  ¨ Certain medicines, such as cold and allergy pills (antihistamines), diet pills (diuretics), and laxatives. ¨ Certain diseases, such as diabetes, cancer, and heart or kidney disease. When should you call for help? Call 911 anytime you think you may need emergency care. For example, call if:  · You passed out (lost consciousness). Call your doctor now or seek immediate medical care if:  · You are confused and cannot think clearly. · You are dizzy or lightheaded, or you feel like you may faint. · You have signs of needing more fluids. You have sunken eyes and a dry mouth, and you pass only a little dark urine. · You cannot keep fluids down. Watch closely for changes in your health, and be sure to contact your doctor if:  · You are not making tears. · Your skin is very dry and sags slowly back into place after you pinch it. · Your mouth and eyes are very dry. Where can you learn more? Go to http://justine-melanie.info/. Enter A617 in the search box to learn more about \"Dehydration: Care Instructions. \"  Current as of: May 27, 2016  Content Version: 11.2  © 1092-5289 [employer]. Care instructions adapted under license by [employer] (which disclaims liability or warranty for this information). If you have questions about a medical condition or this instruction, always ask your healthcare professional. [place] [address] any warranty or liability for your use of this information. We hope that we have addressed all of your medical concerns. The examination and treatment you received in the Emergency Department were for an emergent problem and were not intended as complete care. It is important that you follow up with your healthcare provider(s) for ongoing care.  If your symptoms worsen or do not improve as expected, and you are unable to reach your usual health care provider(s), you should return to the Emergency Department. Today's healthcare is undergoing tremendous change, and patient satisfaction surveys are one of the many tools to assess the quality of medical care. You may receive a survey from the [employer] regarding your experience in the Emergency Department. I hope that your experience has been completely positive, particularly the medical care that I provided. As such, please participate in the survey; anything less than excellent does not meet my expectations or intentions. [facility] and [employer] participate in nationally recognized quality of care measures. If your blood pressure is greater than 120/80, as reported below, we urge that you seek medical care to address the potential of high blood pressure, commonly known as hypertension. Hypertension can be hereditary or can be caused by certain medical conditions, pain, stress, or \"white coat syndrome. \"       Please make an appointment with your health care provider(s) for follow up of your Emergency Department visit. VITALS:   Patient Vitals for the past 8 hrs:   Temp Pulse Resp BP SpO2   06/06/17 2102 - 61 19 - 96 %   06/06/17 2101 - 65 - - 97 %   06/06/17 2100 - (!) 56 15 133/87 98 %   06/06/17 2045 - 66 18 132/85 100 %   06/06/17 2015 - 64 19 134/79 97 %   06/06/17 2000 - 63 19 116/71 98 %   06/06/17 1945 - 60 14 103/72 97 %   06/06/17 1930 - (!) 57 18 110/68 93 %   06/06/17 1917 - 65 13 - 95 %   06/06/17 1916 - 61 23 - 92 %   06/06/17 1915 - 62 22 101/69 91 %   06/06/17 1905 - 69 19 91/55 93 %   06/06/17 1903 - 61 18 - 94 %   06/06/17 1840 - - - - 96 %   06/06/17 1839 - - - 95/68 -   06/06/17 1827 97.3 °F (36.3 °C) 70 16 (!) 82/53 94 %          Thank you for allowing us to provide you with medical care today. We realize that you have many choices for your emergency care needs. Please choose us in the future for any continued health care needs.       Cuco Damico Brent Addison MD    [facility].   Office: [phone]            Recent Results (from the past 24 hour(s))   METABOLIC PANEL, COMPREHENSIVE    Collection Time: 06/06/17  7:08 PM   Result Value Ref Range    Sodium 139 136 - 145 mmol/L    Potassium 4.1 3.5 - 5.1 mmol/L    Chloride 105 97 - 108 mmol/L    CO2 26 21 - 32 mmol/L    Anion gap 8 5 - 15 mmol/L    Glucose 135 (H) 65 - 100 mg/dL    BUN 24 (H) 6 - 20 MG/DL    Creatinine 1.40 (H) 0.70 - 1.30 MG/DL    BUN/Creatinine ratio 17 12 - 20      GFR est AA >60 >60 ml/min/1.73m2    GFR est non-AA 50 (L) >60 ml/min/1.73m2    Calcium 8.8 8.5 - 10.1 MG/DL    Bilirubin, total 0.5 0.2 - 1.0 MG/DL    ALT (SGPT) 18 12 - 78 U/L    AST (SGOT) 18 15 - 37 U/L    Alk. phosphatase 66 45 - 117 U/L    Protein, total 7.0 6.4 - 8.2 g/dL    Albumin 3.8 3.5 - 5.0 g/dL    Globulin 3.2 2.0 - 4.0 g/dL    A-G Ratio 1.2 1.1 - 2.2     CBC WITH AUTOMATED DIFF    Collection Time: 06/06/17  7:08 PM   Result Value Ref Range    WBC 7.4 4.1 - 11.1 K/uL    RBC 4.72 4.10 - 5.70 M/uL    HGB 14.1 12.1 - 17.0 g/dL    HCT 41.3 36.6 - 50.3 %    MCV 87.5 80.0 - 99.0 FL    MCH 29.9 26.0 - 34.0 PG    MCHC 34.1 30.0 - 36.5 g/dL    RDW 13.8 11.5 - 14.5 %    PLATELET 946 092 - 480 K/uL    NEUTROPHILS 78 (H) 32 - 75 %    LYMPHOCYTES 12 12 - 49 %    MONOCYTES 7 5 - 13 %    EOSINOPHILS 3 0 - 7 %    BASOPHILS 0 0 - 1 %    ABS. NEUTROPHILS 5.8 1.8 - 8.0 K/UL    ABS. LYMPHOCYTES 0.9 0.8 - 3.5 K/UL    ABS. MONOCYTES 0.5 0.0 - 1.0 K/UL    ABS. EOSINOPHILS 0.2 0.0 - 0.4 K/UL    ABS.  BASOPHILS 0.0 0.0 - 0.1 K/UL   TROPONIN I    Collection Time: 06/06/17  7:08 PM   Result Value Ref Range    Troponin-I, Qt. <0.04 <0.05 ng/mL   URINALYSIS W/MICROSCOPIC    Collection Time: 06/06/17  8:48 PM   Result Value Ref Range    Color DARK YELLOW      Appearance CLOUDY (A) CLEAR      Specific gravity 1.029 1.003 - 1.030      pH (UA) 6.0 5.0 - 8.0      Protein 30 (A) NEG mg/dL    Glucose NEGATIVE  NEG mg/dL    Ketone TRACE (A) NEG mg/dL    Blood NEGATIVE  NEG      Urobilinogen 0.2 0.2 - 1.0 EU/dL    Nitrites NEGATIVE  NEG      Leukocyte Esterase NEGATIVE  NEG      WBC 0-4 0 - 4 /hpf    RBC 0-5 0 - 5 /hpf    Epithelial cells FEW FEW /lpf    Bacteria NEGATIVE  NEG /hpf    Hyaline cast 2-5 0 - 5 /lpf   BILIRUBIN, CONFIRM    Collection Time: 06/06/17  8:48 PM   Result Value Ref Range    Bilirubin UA, confirm NEGATIVE  NEG         No results found.

## 2017-06-08 ENCOUNTER — PATIENT OUTREACH (OUTPATIENT)
Dept: FAMILY MEDICINE CLINIC | Age: 77
End: 2017-06-08

## 2017-06-09 ENCOUNTER — PATIENT OUTREACH (OUTPATIENT)
Dept: FAMILY MEDICINE CLINIC | Age: 77
End: 2017-06-09

## 2017-06-09 NOTE — LETTER
6/12/2017 10:17 AM 
 
Mr. Dorothy Henriquez [address] [place] [address] I am a Nurse Navigator working with Dr. Bhanu Peck  Part of my job is to follow up with patients who have been in the emergency department to see how they are feeling, answer any questions they may have about their visit and also make sure they have a follow-up appointment to see their primary care doctor. I have been unable to reach you by telephone and wanted to make sure that you scheduled a follow-up appointment to come in and talk about your recent visit to the hospital.  Please call our office to schedule your appointment. In the meantime, if you have any questions or concerns, please feel free to call me on my direct line at Thank you for allowing us to participate in your care!  
 
 
 
 
 
Sincerely, 
 
 
Alejandro Mota LPN

## 2017-06-13 DIAGNOSIS — G47.33 OSA (OBSTRUCTIVE SLEEP APNEA): Primary | ICD-10-CM

## 2017-06-14 ENCOUNTER — TELEPHONE (OUTPATIENT)
Dept: FAMILY MEDICINE CLINIC | Age: 77
End: 2017-06-14

## 2017-06-14 NOTE — TELEPHONE ENCOUNTER
----- Message from [facility] sent at 6/14/2017 12:54 PM EDT -----  Regarding: Dr. Toshia Buckley  Pt wife Mrs. Sally Mayo states that the pt needs to have a referral sent to Dr. Sepideh Blake for a sleep study. The phone number to Dr. Lacey Childs is [phone]. Pt says they need a precert from the insurance company also. Pt wife can be reached at [phone] before 330pm then her cell phone [phone].

## 2017-06-14 NOTE — TELEPHONE ENCOUNTER
Patients wife called stating patient needs a referral to sleep study. She had not yet made the appointment. Gave her the phone number and asked her to call back with appointment information.     Edinson Brown  Phone [phone] Fax [phone] [address] [address]

## 2017-06-20 ENCOUNTER — OFFICE VISIT (OUTPATIENT)
Dept: SLEEP MEDICINE | Age: 77
End: 2017-06-20

## 2017-06-20 ENCOUNTER — DOCUMENTATION ONLY (OUTPATIENT)
Dept: SLEEP MEDICINE | Age: 77
End: 2017-06-20

## 2017-06-20 ENCOUNTER — HOSPITAL ENCOUNTER (OUTPATIENT)
Dept: SLEEP MEDICINE | Age: 77
Discharge: HOME | End: 2017-06-20
Payer: MEDICARE

## 2017-06-20 VITALS
TEMPERATURE: 97.8 F | WEIGHT: 210 LBS | BODY MASS INDEX: 31.1 KG/M2 | HEART RATE: 67 BPM | SYSTOLIC BLOOD PRESSURE: 137 MMHG | OXYGEN SATURATION: 92 % | HEIGHT: 69 IN | DIASTOLIC BLOOD PRESSURE: 80 MMHG

## 2017-06-20 DIAGNOSIS — G47.00 INSOMNIA, UNSPECIFIED TYPE: ICD-10-CM

## 2017-06-20 DIAGNOSIS — G47.33 OBSTRUCTIVE SLEEP APNEA (ADULT) (PEDIATRIC): Primary | ICD-10-CM

## 2017-06-20 PROCEDURE — 95806 SLEEP STUDY UNATT&RESP EFFT: CPT | Performed by: INTERNAL MEDICINE

## 2017-06-20 NOTE — PATIENT INSTRUCTIONS
[address] [facility] [place]. [place], [address]  Tel.  [phone]  Fax. 100 [facility] 60  [place], [address] [facility]  Tel.  [phone]  Fax. [phone] [address]  Tel.  [phone]  Fax. [phone]     Sleep Apnea: After Your Visit  Your Care Instructions  Sleep apnea occurs when you frequently stop breathing for 10 seconds or longer during sleep. It can be mild to severe, based on the number of times per hour that you stop breathing or have slowed breathing. Blocked or narrowed airways in your nose, mouth, or throat can cause sleep apnea. Your airway can become blocked when your throat muscles and tongue relax during sleep. Sleep apnea is common, occurring in 1 out of 20 individuals. Individuals having any of the following characteristics should be evaluated and treated right away due to high risk and detrimental consequences from untreated sleep apnea:  1. Obesity  2. Congestive Heart failure  3. Atrial Fibrillation  4. Uncontrolled Hypertension  5. Type II Diabetes  6. Night-time Arrhythmias  7. Stroke  8. Pulmonary Hypertension  9. High-risk Driving Populations (pilots, truck drivers, etc.)  10. Patients Considering Weight-loss Surgery    How do you know you have sleep apnea? You probably have sleep apnea if you answer 'yes' to 3 or more of the following questions:  S - Have you been told that you Snore? T - Are you often Tired during the day? O - Has anyone Observed you stop breathing while sleeping? P- Do you have (or are being treated for) high blood Pressure? B - Are you obese (Body Mass Index > 35)? A - Is your Age 48years old or older? N - Is your Neck size greater than 16 inches? G - Are you male Gender? A sleep physician can prescribe a breathing device that prevents tissues in the throat from blocking your airway.  Or your doctor may recommend using a dental device (oral breathing device) to help keep your airway open. In some cases, surgery may be needed to remove enlarged tissues in the throat. Follow-up care is a key part of your treatment and safety. Be sure to make and go to all appointments, and call your doctor if you are having problems. It's also a good idea to know your test results and keep a list of the medicines you take. How can you care for yourself at home? · Lose weight, if needed. It may reduce the number of times you stop breathing or have slowed breathing. · Go to bed at the same time every night. · Sleep on your side. It may stop mild apnea. If you tend to roll onto your back, sew a pocket in the back of your pajama top. Put a tennis ball into the pocket, and stitch the pocket shut. This will help keep you from sleeping on your back. · Avoid alcohol and medicines such as sleeping pills and sedatives before bed. · Do not smoke. Smoking can make sleep apnea worse. If you need help quitting, talk to your doctor about stop-smoking programs and medicines. These can increase your chances of quitting for good. · Prop up the head of your bed 4 to 6 inches by putting bricks under the legs of the bed. · Treat breathing problems, such as a stuffy nose, caused by a cold or allergies. · Use a continuous positive airway pressure (CPAP) breathing machine if lifestyle changes do not help your apnea and your doctor recommends it. The machine keeps your airway from closing when you sleep. · If CPAP does not help you, ask your doctor whether you should try other breathing machines. A bilevel positive airway pressure machine has two types of air pressureâone for breathing in and one for breathing out. Another device raises or lowers air pressure as needed while you breathe. · If your nose feels dry or bleeds when using one of these machines, talk with your doctor about increasing moisture in the air. A humidifier may help.   · If your nose is runny or stuffy from using a breathing machine, talk with your doctor about using decongestants or a corticosteroid nasal spray. When should you call for help? Watch closely for changes in your health, and be sure to contact your doctor if:  · You still have sleep apnea even though you have made lifestyle changes. · You are thinking of trying a device such as CPAP. · You are having problems using a CPAP or similar machine. Where can you learn more? Go to Orbiter. Enter W822 in the search box to learn more about \"Sleep Apnea: After Your Visit. \"   © 0895-6223 Healthwise, [employer]. Care instructions adapted under license by [employer] (which disclaims liability or warranty for this information). This care instruction is for use with your licensed healthcare professional. If you have questions about a medical condition or this instruction, always ask your healthcare professional. Dino Luma any warranty or liability for your use of this information. PROPER SLEEP HYGIENE    What to avoid  · Do not have drinks with caffeine, such as coffee or black tea, for 8 hours before bed. · Do not smoke or use other types of tobacco near bedtime. Nicotine is a stimulant and can keep you awake. · Avoid drinking alcohol late in the evening, because it can cause you to wake in the middle of the night. · Do not eat a big meal close to bedtime. If you are hungry, eat a light snack. · Do not drink a lot of water close to bedtime, because the need to urinate may wake you up during the night. · Do not read or watch TV in bed. Use the bed only for sleeping and sexual activity. What to try  · Go to bed at the same time every night, and wake up at the same time every morning. Do not take naps during the day. · Keep your bedroom quiet, dark, and cool. · Get regular exercise, but not within 3 to 4 hours of your bedtime. .  · Sleep on a comfortable pillow and mattress.   · If watching the clock makes you anxious, turn it facing away from you so you cannot see the time. · If you worry when you lie down, start a worry book. Well before bedtime, write down your worries, and then set the book and your concerns aside. · Try meditation or other relaxation techniques before you go to bed. · If you cannot fall asleep, get up and go to another room until you feel sleepy. Do something relaxing. Repeat your bedtime routine before you go to bed again. · Make your house quiet and calm about an hour before bedtime. Turn down the lights, turn off the TV, log off the computer, and turn down the volume on music. This can help you relax after a busy day. Drowsy Driving  The [address] [place] [address] Road Traffic Safety Administration cites drowsiness as a causing factor in more than 766,942 police reported crashes annually, resulting in 76,000 injuries and 1,500 deaths. Other surveys suggest 55% of people polled have driven while drowsy in the past year, 23% had fallen asleep but not crashed, 3% crashed, and 2% had and accident due to drowsy driving. Who is at risk? Young Drivers: One study of drowsy driving accidents states that 55% of the drivers were under 25 years. Of those, 75% were male. Shift Workers and Travelers: People who work overnight or travel across time zones frequently are at higher risk of experiencing Circadian Rhythm Disorders. They are trying to work and function when their body is programed to sleep. Sleep Deprived: Lack of sleep has a serious impact on your ability to pay attention or focus on a task. Consistently getting less than the average of 8 hours your body needs creates partial or cumulative sleep deprivation. Untreated Sleep Disorders: Sleep Apnea, Narcolepsy, R.L.S., and other sleep disorders (untreated) prevent a person from getting enough restful sleep. This leads to excessive daytime sleepiness and increases the risk for drowsy driving accidents by up to 7 times.   Medications / Alcohol: Even over the counter medications can cause drowsiness. Medications that impair a drivers attention should have a warning label. Alcohol naturally makes you sleepy and on its own can cause accidents. Combined with excessive drowsiness its effects are amplified. Signs of Drowsy Driving:   * You don't remember driving the last few miles   * You may drift out of your kassidy   * You are unable to focus and your thoughts wander   * You may yawn more often than normal   * You have difficulty keeping your eyes open / nodding off   * Missing traffic signs, speeding, or tailgating  Prevention-   Good sleep hygiene, lifestyle and behavioral choices have the most impact on drowsy driving. There is no substitute for sleep and the average person requires 8 hours nightly. If you find yourself driving drowsy, stop and sleep. Consider the sleep hygiene tips provided during your visit as well. Medication Refill Policy: Refills for all medications require 1 week advance notice. Please have your pharmacy fax a refill request. We are unable to fax, or call in \"controled substance\" medications and you will need to pick these prescriptions up from our office. Amind Activation    Thank you for requesting access to Amind. Please follow the instructions below to securely access and download your online medical record. Amind allows you to send messages to your doctor, view your test results, renew your prescriptions, schedule appointments, and more. How Do I Sign Up? 1. In your internet browser, go to [email or web address]. 2. Click on the First Time User? Click Here link in the Sign In box. You will see the New Member Sign Up page. 3. Enter your Amind Access Code exactly as it appears below. You will not need to use this code after youve completed the sign-up process. If you do not sign up before the expiration date, you must request a new code. Amind Access Code:  Activation code not generated  Current Amind Status: Active (This is the date your FORMTEK access code will )    4. Enter the last four digits of your Social Security Number (xxxx) and Date of Birth (mm/dd/yyyy) as indicated and click Submit. You will be taken to the next sign-up page. 5. Create a Jenn Rykertt ID. This will be your FORMTEK login ID and cannot be changed, so think of one that is secure and easy to remember. 6. Create a FORMTEK password. You can change your password at any time. 7. Enter your Password Reset Question and Answer. This can be used at a later time if you forget your password. 8. Enter your e-mail address. You will receive e-mail notification when new information is available in [address] 9. Click Sign Up. You can now view and download portions of your medical record. 10. Click the Download Summary menu link to download a portable copy of your medical information. Additional Information    If you have questions, please call [phone]. Remember, FORMTEK is NOT to be used for urgent needs. For medical emergencies, dial 487. [address] [place]. [place], [address]  Tel.  [phone]  Fax. 100 [facility] 60  [address]  Tel.  [phone]  Fax. [phone] [address] [address]  Tel.  [phone]  Fax. [phone]       Insomnia: After Your Visit  Your Care Instructions  Insomnia is the inability to sleep well. It is a common problem for most people at some time. Insomnia may make it hard for you to get to sleep, stay asleep, or sleep as long as you need to. This can make you tired and grouchy during the day. It can also make you forgetful, less effective at work, and unhappy. Insomnia can be caused by conditions such as depression or anxiety. Pain can also affect your ability to sleep. When these problems are solved, the insomnia usually clears up. But sometimes bad sleep habits can cause insomnia.   If insomnia is affecting your work or your enjoyment of life, you can take steps to improve your sleep. Follow-up care is a key part of your treatment and safety. Be sure to make and go to all appointments, and call your doctor if you are having problems. It's also a good idea to know your test results and keep a list of the medicines you take. How can you care for yourself at home? What to avoid  · Do not have drinks with caffeine, such as coffee or black tea, for 8 hours before bed. · Do not smoke or use other types of tobacco near bedtime. Nicotine is a stimulant and can keep you awake. · Avoid drinking alcohol late in the evening, because it can cause you to wake in the middle of the night. · Do not eat a big meal close to bedtime. If you are hungry, eat a light snack. · Do not drink a lot of water close to bedtime, because the need to urinate may wake you up during the night. · Do not read or watch TV in bed. Use the bed only for sleeping and sexual activity. What to try  · Go to bed at the same time every night, and wake up at the same time every morning. Do not take naps during the day. · Keep your bedroom quiet, dark, and cool. · Get regular exercise, but not within 3 to 4 hours of your bedtime. .  · Sleep on a comfortable pillow and mattress. · If watching the clock makes you anxious, turn it facing away from you so you cannot see the time. · If you worry when you lie down, start a worry book. Well before bedtime, write down your worries, and then set the book and your concerns aside. · Try meditation or other relaxation techniques before you go to bed. · If you cannot fall asleep, get up and go to another room until you feel sleepy. Do something relaxing. Repeat your bedtime routine before you go to bed again. · Make your house quiet and calm about an hour before bedtime. Turn down the lights, turn off the TV, log off the computer, and turn down the volume on music. This can help you relax after a busy day.   When should you call for help? Watch closely for changes in your health, and be sure to contact your doctor if:  · Your efforts to improve your sleep do not work. · Your insomnia gets worse. · You fall asleep during the day. Where can you learn more? Go to VirtueBuild.be  Enter P513 in the search box to learn more about \"Insomnia: After Your Visit. \"   © 6543-3451 Healthwise, Incorporated. Care instructions adapted under license by [facility] (which disclaims liability or warranty for this information). This care instruction is for use with your licensed healthcare professional. If you have questions about a medical condition or this instruction, always ask your healthcare professional. [place] [address] any warranty or liability for your use of this information. Content Version: 3.0.44264; Last Revised: June 26, 2010    Drowsy Driving: The [place] [address] cites drowsiness as a causing factor in more than 564,197 police reported crashes annually, resulting in 76,000 injuries and 1,500 deaths. Other surveys suggest 55% of people polled have driven while drowsy in the past year, 23% had fallen asleep but not crashed, 3% crashed, and 2% had and accident due to drowsy driving. Who is at risk? Young Drivers: One study of drowsy driving accidents states that 55% of the drivers were under 25 years. Of those, 75% were male. Shift Workers and Travelers: People who work overnight or travel across time zones frequently are at higher risk of experiencing Circadian Rhythm Disorders. They are trying to work and function when their body is programed to sleep. Sleep Deprived: Lack of sleep has a serious impact on your ability to pay attention or focus on a task. Consistently getting less than the average of 8 hours your body needs creates partial or cumulative sleep deprivation.    Untreated Sleep Disorders: Sleep Apnea, Narcolepsy, R.L.S., and other sleep disorders (untreated) prevent a person from getting enough restful sleep. This leads to excessive daytime sleepiness and increases the risk for drowsy driving accidents by up to 7 times. Medications / Alcohol: Even over the counter medications can cause drowsiness. Medications that impair a drivers attention should have a warning label. Alcohol naturally makes you sleepy and on its own can cause accidents. Combined with excessive drowsiness its effects are amplified. Signs of Drowsy Driving:   * You don't remember driving the last few miles   * You may drift out of your kassidy   * You are unable to focus and your thoughts wander   * You may yawn more often than normal   * You have difficulty keeping your eyes open / nodding off   * Missing traffic signs, speeding, or tailgating  Prevention-   Good sleep hygiene, lifestyle and behavioral choices have the most impact on drowsy driving. There is no substitute for sleep and the average person requires 8 hours nightly. If you find yourself driving drowsy, stop and sleep. Consider the sleep hygiene tips provided during your visit as well. Medication Refill Policy: Refills for all medications require 1 week advance notice. Please have your pharmacy fax a refill request. We are unable to fax, or call in \"controled substance\" medications and you will need to pick these prescriptions up from our office. Venaxis Activation    Thank you for requesting access to Venaxis. Please follow the instructions below to securely access and download your online medical record. Venaxis allows you to send messages to your doctor, view your test results, renew your prescriptions, schedule appointments, and more. How Do I Sign Up?    11. In your internet browser, go to [email or web address]. 12. Click on the First Time User? Click Here link in the Sign In box. You will see the New Member Sign Up page.   15. Enter your Venaxis Access Code exactly as it appears below. You will not need to use this code after youve completed the sign-up process. If you do not sign up before the expiration date, you must request a new code. GetShopApp Access Code: Activation code not generated  Current GetShopApp Status: Active (This is the date your GetShopApp access code will )    14. Enter the last four digits of your Social Security Number (xxxx) and Date of Birth (mm/dd/yyyy) as indicated and click Submit. You will be taken to the next sign-up page. 15. Create a Umengt ID. This will be your GetShopApp login ID and cannot be changed, so think of one that is secure and easy to remember. 12. Create a GetShopApp password. You can change your password at any time. 16. Enter your Password Reset Question and Answer. This can be used at a later time if you forget your password. 25. Enter your e-mail address. You will receive e-mail notification when new information is available in [address] 19. Click Sign Up. You can now view and download portions of your medical record. 20. Click the Download Summary menu link to download a portable copy of your medical information. Additional Information    If you have questions, please call [phone]. Remember, GetShopApp is NOT to be used for urgent needs. For medical emergencies, dial 911.

## 2017-06-20 NOTE — PROGRESS NOTES
217 [facility] [place]. [place], [address]  Tel.  [phone]  Fax. 100 [facility] 60  [place], [address] [facility]  Tel.  [phone]  Fax. [phone] [address]  Tel.  [phone]  Fax. [phone]         Subjective:      Yokasta Dia is an 78 y.o. male referred for evaluation for a sleep disorder. He complains of snoring, periods of not breathing associated with excessive daytime sleepiness. Symptoms began several years ago, gradually worsening since that time. He usually can fall asleep in 60 minutes. Family or house members note snoring, periods of not breathing. He denies falling asleep while during conversation. Yokasta Dia does wake up frequently at night. He is bothered by waking up too early and left unable to get back to sleep. He actually sleeps about 4 hours at night and wakes up about 3 (between 1-3) times during the night. He does not work shifts:  .   Henri Steven indicates he does get too little sleep at night. His bedtime is 2300. He awakens at 0130. He does take naps. He takes 5 naps a week lasting 30 to 45, Minute(s). He has the following observed behaviors: Pauses in breathing;  . Other remarks: snoring(unspecified), waking with a gasp or snort   He had a previous diagnosis of sleep apnea and was maintained on CPAP for years until it broke and was told that he needed to repeat a sleep study. He delayed coming in for years because he did not want to have a sleep study. He finds it difficult not to be at home to sleep. 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 [place] Sleepiness Score: 12   which reflect mild daytime drowsiness.     Allergies   Allergen Reactions    Bactrim [Sulfamethoprim Ds] Seizures    Sulfa (Sulfonamide Antibiotics) Other (comments)     Not sure what happens was taking during time of seizure and other brothers are allergic. Current Outpatient Prescriptions:     Omega-3 Fatty Acids 60- mg cpDR, Take  by mouth., Disp: , Rfl:     doxycycline (ADOXA) 100 mg tablet, Take 1 Tab by mouth daily for 30 days. , Disp: 30 Tab, Rfl: 0    tamsulosin (FLOMAX) 0.4 mg capsule, Take 1 Cap by mouth daily. , Disp: 30 Cap, Rfl: 1    ascorbic acid, vitamin C, (VITAMIN C) 250 mg tablet, Take  by mouth., Disp: , Rfl:     rosuvastatin (CRESTOR) 5 mg tablet, Take 1 Tab by mouth nightly., Disp: 30 Tab, Rfl: 3    multivitamin (ONE A DAY) tablet, Take 1 Tab by mouth daily. , Disp: , Rfl:     clotrimazole-betamethasone (LOTRISONE) topical cream, Apply twice daily for 2 weeks, Disp: 45 g, Rfl: 1    levothyroxine (SYNTHROID) 50 mcg tablet, TAKE 1 TABLET BY MOUTH EVERY MORNING AT 7AM, Disp: 30 Tab, Rfl: 5     He  has a past medical history of Seizures ([address] and Sleep apnea (2008). He  has a past surgical history that includes knee arthroscp harv. He family history includes Heart Disease in his father and mother. He  reports that he has quit smoking. He has never used smokeless tobacco. He reports that he does not drink alcohol or use illicit drugs. Review of Systems:  Constitutional: +25 pound  weight gain. Eyes:  No blurred vision. CVS:  No significant chest pain  Pulm:  No significant shortness of breath  GI:  No significant nausea or vomiting  :  No significant nocturia  Musculoskeletal:  No significant joint pain at night  Skin:  No significant rashes  Neuro:  No significant dizziness, he reports that only seizure he had was when he was taking a sulfa medication.  He has not had seizures since then and is not on seizure medication  Psych:  No active mood issues    Sleep Review of Systems: notable for + difficulty falling asleep; +frequent awakenings at night;  rare dreaming noted; no nightmares ; no early morning headaches; no memory problems; no concentration issues; no history of any automobile or occupational accidents due to daytime drowsiness. Objective:     Visit Vitals    /80    Pulse 67    Temp 97.8 °F (36.6 °C)    Ht 5' 9\" (1.753 m)    Wt 210 lb (95.3 kg)    SpO2 92%    BMI 31.01 kg/m2         General:   Not in acute distress   Eyes:  Anicteric sclerae, no obvious strabismus   Nose:  No obvious nasal septum deviation    Oropharynx:   Class 3 oropharyngeal outlet, thick tongue base, enlarged and boggy uvula, low-lying soft palate, narrow tonsilo-pharyngeal pilars   Tonsils:   tonsils are present and normal   Neck:   Neck circ. in \"inches\": 16.5; midline trachea   Chest/Lungs:  Equal lung expansion, clear on auscultation    CVS:  Normal rate, regular rhythm; no JVD   Skin:  Warm to touch; no obvious rashes   Neuro:  No focal deficits ; no obvious tremor    Psych:  Normal affect,  normal countenance;          Assessment:       ICD-10-CM ICD-9-CM    1. Obstructive sleep apnea (adult) (pediatric) G47.33 327.23 SLEEP STUDY UNATTENDED, 4 CHANNEL   2. Insomnia, unspecified type G47.00 780.52          Plan:     * The patient currently has a Moderate Risk for having sleep apnea. STOP-BANG score 5.  * PSG was ordered for initial evaluation. he strongly prefers a home sleep apnea test.    * He was provided information on sleep apnea including coresponding risk factors and the importance of proper treatment. * Counseling was provided regarding proper sleep hygiene and safe driving. *I will call him with the results. Treatment options for sleep apnea were reviewed. he is not against a trial of PAP if found to have significant sleep apnea. 2. Insomnia - I have advised a regular sleep wake cycle. he  was advised to avoid looking at the clock during the night. Ideally, the clock face should be turned away. he was advised to minimize caffeine use and to avoid caffeine-containing beverages 8 hours prior to bedtime. Naps were discouraged.   A regular exercise schedule, at least 3 hours before bedtime, would be beneficial to improving sleep quality. he was advised to avoid evening light and to use sunglasses in the late afternoon. Watching TV, using laptops, tablets and smartphones in the evening was discouraged. he  was advised to keep the bedroom cool and dark. Pets should not be allowed to sleep in the bed. Relaxation strategies reviewed in detail. All of his questions were addressed. Thank you for allowing us to participate in your patient's medical care. We'll keep you updated on these investigations.   Checo Ríos MD  Diplomate in Sleep Medicine  [facility]

## 2017-06-20 NOTE — Clinical Note
Thank you for the referral.  I will keep you informed of his progress.  [address], [place]

## 2017-06-22 ENCOUNTER — TELEPHONE (OUTPATIENT)
Dept: SLEEP MEDICINE | Age: 77
End: 2017-06-22

## 2017-06-22 NOTE — TELEPHONE ENCOUNTER
HSAT Returned    Date of Study: 6/20/17    The following information was gathered from the patients study log:    · Lights off: 10:30p  · Estimated sleep onset: 3:00a    · Awakened a total of 4 times  · The patient felt they slept 4 hours  · Patient took no sleep aid before starting the test  · Sleep quality was the same compared to a usual nights sleep.     Further information provided: none

## 2017-06-26 ENCOUNTER — TELEPHONE (OUTPATIENT)
Dept: SLEEP MEDICINE | Age: 77
End: 2017-06-26

## 2017-06-26 DIAGNOSIS — G47.39 MIXED SLEEP APNEA: Primary | ICD-10-CM

## 2017-06-26 NOTE — TELEPHONE ENCOUNTER
The results of his home sleep study were reviewed. The results were positive for significant sleep disordered breathing. It showed severe mixed apnea.   Plan - attended PAP titration  Schedule titration and I will call with results and order machine  All of his questions were addressed  Order attached

## 2017-06-27 ENCOUNTER — DOCUMENTATION ONLY (OUTPATIENT)
Dept: SLEEP MEDICINE | Age: 77
End: 2017-06-27

## 2017-06-27 NOTE — PROGRESS NOTES
This note is being routed to Dr. Gómez Frederick. Sleep Medicine consult note and sleep study report in patient's chart for review.     Thank you for the referral.

## 2017-08-11 ENCOUNTER — HOSPITAL ENCOUNTER (OUTPATIENT)
Dept: SLEEP MEDICINE | Age: 77
Discharge: HOME | End: 2017-08-11
Payer: MEDICARE

## 2017-08-11 VITALS
BODY MASS INDEX: 33.11 KG/M2 | TEMPERATURE: 98.1 F | OXYGEN SATURATION: 94 % | SYSTOLIC BLOOD PRESSURE: 123 MMHG | DIASTOLIC BLOOD PRESSURE: 83 MMHG | HEIGHT: 66 IN | WEIGHT: 206 LBS | HEART RATE: 67 BPM

## 2017-08-11 DIAGNOSIS — G47.39 MIXED SLEEP APNEA: ICD-10-CM

## 2017-08-11 PROCEDURE — 95811 POLYSOM 6/>YRS CPAP 4/> PARM: CPT | Performed by: INTERNAL MEDICINE

## 2017-08-15 ENCOUNTER — DOCUMENTATION ONLY (OUTPATIENT)
Dept: SLEEP MEDICINE | Age: 77
End: 2017-08-15

## 2017-08-15 ENCOUNTER — TELEPHONE (OUTPATIENT)
Dept: SLEEP MEDICINE | Age: 77
End: 2017-08-15

## 2017-08-15 DIAGNOSIS — G47.39 MIXED SLEEP APNEA: Primary | ICD-10-CM

## 2017-08-15 NOTE — TELEPHONE ENCOUNTER
Patient called and identity confirmed with 2 patient identifers  Initial Apnea/Hypopnea index was 63. He elected to proceed with a positive airway pressure trial (CPAP) and a titration study was ordered and reviewed with the patient. Most of the respiratory events were resolved on Bi - Level 13/8 cmH2O on his right side. He was advised to prop himself with pillows to maintain sleeping position on right side. He says he prefers to sleep on his sides due to lower back pain. PLAN - order bipap and followup. All of his questions addressed  Order PAP and call patient and let them know which [employer] company they should be hearing from. Schedule for first adherence visit in 6 weeks.

## 2017-08-17 ENCOUNTER — TELEPHONE (OUTPATIENT)
Dept: SLEEP MEDICINE | Age: 77
End: 2017-08-17

## 2017-08-17 ENCOUNTER — DOCUMENTATION ONLY (OUTPATIENT)
Dept: SLEEP MEDICINE | Age: 77
End: 2017-08-17

## 2017-08-17 NOTE — TELEPHONE ENCOUNTER
PAP adherence appointment was discussed with patient. Patient has declined to schedule with provider.  Patient will call back to schedule once he has been setup

## 2017-08-17 NOTE — PROGRESS NOTES
Faxed CPAP order to [facility], patient has been informed of DME contact information

## 2017-08-22 ENCOUNTER — TELEPHONE (OUTPATIENT)
Dept: SLEEP MEDICINE | Age: 77
End: 2017-08-22

## 2017-08-22 NOTE — TELEPHONE ENCOUNTER
CPAP failure documented on sleep study interpretation.  See body of data for report stating CPAP failed to control respiratory events

## 2017-08-22 NOTE — TELEPHONE ENCOUNTER
[employer] faxed us document requesting additional documentation to approve for CPAP for patient. They mentioned following (document scanned in media): For initial 3 month trial therapy; documentation of trial of  (cpap) including clinical response. Please advise. They have asked for response within 48 hrs, hence I marked as high importance.

## 2017-08-23 ENCOUNTER — DOCUMENTATION ONLY (OUTPATIENT)
Dept: SLEEP MEDICINE | Age: 77
End: 2017-08-23

## 2017-08-23 NOTE — TELEPHONE ENCOUNTER
Tried to call [facility] ([facility]) on 8/22, M to call back. Also faxed clinicals today 8/23/17.

## 2017-08-23 NOTE — TELEPHONE ENCOUNTER
Called [facility] to follow up. Spoke with Yeni Cortés. She said that they received the documentation we faxed over and they are reviewing it.

## 2017-08-23 NOTE — PROGRESS NOTES
Raymond pino dme called advised [facility] insurance requested office note from 6/20/2017 stating patient needs bipap machine, advised patient did not have titration study done until 8/2017 where it is documented cpap failure needs bipap. [place] [facility] will contact [facility] again see how they want to move forward.

## 2017-09-12 ENCOUNTER — DOCUMENTATION ONLY (OUTPATIENT)
Dept: SLEEP MEDICINE | Age: 77
End: 2017-09-12

## 2017-09-12 NOTE — PROGRESS NOTES
Patient LVM for DME information, returned patients call advised DME is [facility] and gave patient their contact number.

## 2017-09-12 NOTE — PROGRESS NOTES
Faxed PAP order to [address] [facility] Patient (Patient requested since he has used them in the past) per patient has [facility] and [facility] is out of network.  Gave patient dme contact information for his records

## 2017-10-06 ENCOUNTER — DOCUMENTATION ONLY (OUTPATIENT)
Dept: SLEEP MEDICINE | Age: 77
End: 2017-10-06

## 2017-10-06 NOTE — PROGRESS NOTES
Per Will Barahona from [facility], patient is calling his insurance copy to cancel authorization that another DME (out of network) and once authorization is removed [facility] can continue processing order

## 2018-02-21 ENCOUNTER — OFFICE VISIT (OUTPATIENT)
Dept: FAMILY MEDICINE CLINIC | Age: 78
End: 2018-02-21

## 2018-02-21 VITALS
TEMPERATURE: 98.4 F | RESPIRATION RATE: 16 BRPM | DIASTOLIC BLOOD PRESSURE: 73 MMHG | BODY MASS INDEX: 33.91 KG/M2 | WEIGHT: 211 LBS | HEART RATE: 56 BPM | SYSTOLIC BLOOD PRESSURE: 133 MMHG | OXYGEN SATURATION: 96 % | HEIGHT: 66 IN

## 2018-02-21 DIAGNOSIS — N40.1 NOCTURIA ASSOCIATED WITH BENIGN PROSTATIC HYPERPLASIA: Primary | ICD-10-CM

## 2018-02-21 DIAGNOSIS — Z23 NEED FOR SHINGLES VACCINE: ICD-10-CM

## 2018-02-21 DIAGNOSIS — R35.1 NOCTURIA ASSOCIATED WITH BENIGN PROSTATIC HYPERPLASIA: Primary | ICD-10-CM

## 2018-02-21 DIAGNOSIS — N40.1 NOCTURIA ASSOCIATED WITH BENIGN PROSTATIC HYPERPLASIA: ICD-10-CM

## 2018-02-21 DIAGNOSIS — G47.33 OSA (OBSTRUCTIVE SLEEP APNEA): ICD-10-CM

## 2018-02-21 DIAGNOSIS — Z13.220 LIPID SCREENING: ICD-10-CM

## 2018-02-21 DIAGNOSIS — E78.9 LIPID DISORDER: ICD-10-CM

## 2018-02-21 DIAGNOSIS — E03.9 HYPOTHYROIDISM, UNSPECIFIED TYPE: ICD-10-CM

## 2018-02-21 DIAGNOSIS — N41.9 PROSTATITIS, UNSPECIFIED PROSTATITIS TYPE: ICD-10-CM

## 2018-02-21 DIAGNOSIS — M19.90 OSTEOARTHRITIS, UNSPECIFIED OSTEOARTHRITIS TYPE, UNSPECIFIED SITE: ICD-10-CM

## 2018-02-21 DIAGNOSIS — R35.1 NOCTURIA ASSOCIATED WITH BENIGN PROSTATIC HYPERPLASIA: ICD-10-CM

## 2018-02-21 DIAGNOSIS — R56.9 SEIZURE (HCC): ICD-10-CM

## 2018-02-21 DIAGNOSIS — Z00.00 HEALTH CARE MAINTENANCE: Primary | ICD-10-CM

## 2018-02-21 DIAGNOSIS — D32.9 MENINGIOMA (HCC): ICD-10-CM

## 2018-02-21 RX ORDER — TAMSULOSIN HYDROCHLORIDE 0.4 MG/1
0.4 CAPSULE ORAL DAILY
Qty: 30 CAP | Refills: 1 | Status: SHIPPED | OUTPATIENT
Start: 2018-02-21 | End: 2018-02-21 | Stop reason: ALTCHOICE

## 2018-02-21 RX ORDER — ALFUZOSIN HYDROCHLORIDE 10 MG/1
10 TABLET, EXTENDED RELEASE ORAL DAILY
Qty: 30 TAB | Refills: 3 | Status: SHIPPED | OUTPATIENT
Start: 2018-02-21 | End: 2018-04-17 | Stop reason: SDUPTHER

## 2018-02-21 RX ORDER — ROSUVASTATIN CALCIUM 5 MG/1
5 TABLET, COATED ORAL
Qty: 30 TAB | Refills: 3 | Status: SHIPPED | OUTPATIENT
Start: 2018-02-21 | End: 2022-03-22

## 2018-02-21 NOTE — ASSESSMENT & PLAN NOTE
This condition is managed by Specialist.  Key Oncology Meds     Patient is on no Oncologic meds. Key Pain Meds     The patient is on no pain meds. Lab Results   Component Value Date/Time    WBC 7.4 06/06/2017 07:08 PM    ABS.  NEUTROPHILS 5.8 06/06/2017 07:08 PM    HGB 14.1 06/06/2017 07:08 PM    HCT 41.3 06/06/2017 07:08 PM    PLATELET 319 97/39/6131 07:08 PM    Creatinine 1.40 06/06/2017 07:08 PM    BUN 24 06/06/2017 07:08 PM    ALT (SGPT) 18 06/06/2017 07:08 PM    AST (SGOT) 18 06/06/2017 07:08 PM    Albumin 3.8 06/06/2017 07:08 PM    Prostate Specific Ag 9.7 02/07/2017 04:31 PM

## 2018-02-21 NOTE — LETTER
2/21/2018 10:46 AM 
 
Mr. Fredy Blake [address] [place] [facility] [address] Body mass index is 34.06 kg/(m^2). Focus on regular exercise (150 minutes each week) and healthy eating. Eat more fruits and vegetables. Eat more protein (egg whites, beans, and nuts you know you tolerate) and less carbohydrates (white bread, white rice, white pasta, white potatoes, sodas, and sweets). Eat appropriately small portion sizes. Sincerely, Albert Briggs MD

## 2018-02-21 NOTE — LETTER
2/26/2018 4:02 PM 
 
Mr. Piper Chau [address]  [place] [address] Dear Piper Chau: 
 
Please find your most recent results below. Resulted Orders CULTURE, URINE Result Value Ref Range Urine Culture, Routine No growth Narrative Performed at:  [address] [place] [address]  [id number] : Jose Hackett MD, Phone:  [phone] PSA, DIAGNOSTIC (PROSTATE SPECIFIC AG) Result Value Ref Range Prostate Specific Ag 9.6 (H) 0.0 - 4.0 ng/mL Comment:  
   Roche ECLIA methodology. According to the American Urological Association, Serum PSA should 
decrease and remain at undetectable levels after radical 
prostatectomy. The AUA defines biochemical recurrence as an initial 
PSA value 0.2 ng/mL or greater followed by a subsequent confirmatory PSA value 0.2 ng/mL or greater. Values obtained with different assay methods or kits cannot be used 
interchangeably. Results cannot be interpreted as absolute evidence 
of the presence or absence of malignant disease. Narrative Performed at:  [address]  [id number] : Neno Carranza MD, Phone:  [phone] CBC W/O DIFF Result Value Ref Range WBC 6.3 3.4 - 10.8 x10E3/uL  
 RBC 5.07 4. 14 - 5.80 x10E6/uL HGB 15.5 13.0 - 17.7 g/dL HCT 45.0 37.5 - 51.0 % MCV 89 79 - 97 fL  
 MCH 30.6 26.6 - 33.0 pg  
 MCHC 34.4 31.5 - 35.7 g/dL  
 RDW 14.2 12.3 - 15.4 % PLATELET 848 702 - 084 x10E3/uL Narrative Performed at:  [address]  [id number] : Neno Carranza MD, Phone:  [phone] METABOLIC PANEL, COMPREHENSIVE Result Value Ref Range Glucose 103 (H) 65 - 99 mg/dL BUN 25 8 - 27 mg/dL Creatinine 0.97 0.76 - 1.27 mg/dL GFR est non-AA 76 >59 GFR est AA 88 >59 BUN/Creatinine ratio 26 (H) 10 - 24  Sodium 137 134 - 144 mmol/L  
 Potassium 4.7 3.5 - 5.2 mmol/L Chloride 100 96 - 106 mmol/L  
 CO2 24 18 - 29 mmol/L Calcium 9.4 8.6 - 10.2 mg/dL Protein, total 6.8 6.0 - 8.5 g/dL Albumin 4.3 3.5 - 4.8 g/dL GLOBULIN, TOTAL 2.5 1.5 - 4.5 A-G Ratio 1.7 1.2 - 2.2 Bilirubin, total 0.4 0.0 - 1.2 mg/dL Alk. phosphatase 77 39 - 117 IU/L  
 AST (SGOT) 17 0 - 40 IU/L  
 ALT (SGPT) 15 0 - 44 IU/L Narrative Performed at:  [address]  [id number] : Casper Tolbert MD, Phone:  [phone] TSH 3RD GENERATION Result Value Ref Range TSH 2.920 0.450 - 4.500 uIU/mL Narrative Performed at:  [address]  [id number] : Casper Tolbert MD, Phone:  [phone] AMB POC URINE, MICROALBUMIN, SEMIQUANT (3 RESULTS) Result Value Ref Range ALBUMIN, URINE POC 10 Negative mg/L  
 CREATININE, URINE  mg/dL Microalbumin/creat ratio (POC) <30 MG/G  
 
 
RECOMMENDATIONS: 
 
Your urine culture is negative. Sincerely, Esther King MD

## 2018-02-21 NOTE — MR AVS SNAPSHOT
[address] [phone] Patient: Oniel Tafoya MRN: [id number] :[id number] Visit Information Date & Time Provider Department Dept. Phone Encounter #  
 2018 10:10 AM Viraj Valdez MD [facility] [phone] [id number] Upcoming Health Maintenance Date Due  
 GLAUCOMA SCREENING Q2Y 2019 MEDICARE YEARLY EXAM 2019 DTaP/Tdap/Td series (2 - Td) 2024 Allergies as of 2018  Review Complete On: 2018 By: Viraj Valdez MD  
  
 Severity Noted Reaction Type Reactions Bactrim [Sulfamethoprim Ds]  2014    Seizures Sulfa (Sulfonamide Antibiotics)  2015    Other (comments) Not sure what happens was taking during time of seizure and other brothers are allergic. Current Immunizations  Reviewed on 2017 Name Date Influenza Vaccine (Quad) PF 2017 Pneumococcal Polysaccharide (PPSV-23) 2014 Tdap 2014 Not reviewed this visit You Were Diagnosed With   
  
 Codes Comments Health care maintenance    -  Primary ICD-10-CM: Z00.00 ICD-9-CM: V70.0   
 RACHEL (obstructive sleep apnea)     ICD-10-CM: G47.33 
ICD-9-CM: 327.23 FTF for BiPap Need for shingles vaccine     ICD-10-CM: Q55 ICD-9-CM: V04.89 Rx given for singrix and PCV 13 (handwritten, see scan) Osteoarthritis, unspecified osteoarthritis type, unspecified site     ICD-10-CM: M19.90 ICD-9-CM: 715.90 Hypothyroidism, unspecified type     ICD-10-CM: E03.9 ICD-9-CM: 244.9 he is OFF Synthroid 50 mcg for months Meningioma ([address]     ICD-10-CM: D32.9 ICD-9-CM: 225.2 Seizure ([address]     ICD-10-CM: R56.9 ICD-9-CM: 780.39 Nocturia associated with benign prostatic hyperplasia     ICD-10-CM: N40.1, R35.1 ICD-9-CM: 600.01, 788.43 Lipid screening     ICD-10-CM: V22.712 ICD-9-CM: V77.91   
 Prostatitis, unspecified prostatitis type     ICD-10-CM: N41.9 ICD-9-CM: 601.9 Lipid disorder     ICD-10-CM: E78.9 ICD-9-CM: 272.9 Vitals BP Pulse Temp Resp Height(growth percentile) Weight(growth percentile) 133/73 (BP 1 Location: Right arm, BP Patient Position: Sitting) (!) 56 98.4 °F (36.9 °C) (Oral) 16 5' 6\" (1.676 m) 211 lb (95.7 kg) SpO2 BMI Smoking Status 96% 34.06 kg/m2 Former Smoker Vitals History BMI and BSA Data Body Mass Index Body Surface Area 34.06 kg/m 2 2.11 m 2 Preferred Pharmacy Pharmacy Name Phone [facility] [address] [place] [facility] [phone] Your Updated Medication List  
  
   
This list is accurate as of 2/21/18 11:01 AM.  Always use your most recent med list.  
  
  
  
  
 levothyroxine 50 mcg tablet Commonly known as:  SYNTHROID  
TAKE 1 TABLET BY MOUTH EVERY MORNING AT 7AM  
  
 multivitamin tablet Commonly known as:  ONE A DAY Take 1 Tab by mouth daily. rosuvastatin 5 mg tablet Commonly known as:  CRESTOR Take 1 Tab by mouth nightly. tamsulosin 0.4 mg capsule Commonly known as:  FLOMAX Take 1 Cap by mouth daily. VITAMIN C 250 mg tablet Generic drug:  ascorbic acid (vitamin C) Take  by mouth. Prescriptions Sent to Pharmacy Refills  
 tamsulosin (FLOMAX) 0.4 mg capsule 1 Sig: Take 1 Cap by mouth daily. Class: Normal  
 Pharmacy: [address] Ph #: [phone] Route: Oral  
 rosuvastatin (CRESTOR) 5 mg tablet 3 Sig: Take 1 Tab by mouth nightly. Class: Normal  
 Pharmacy: [address] Ph #: [phone] Route: Oral  
  
We Performed the Following AMB POC URINE, MICROALBUMIN, SEMIQUANT (3 RESULTS) [01274 CPT(R)] CBC W/O DIFF [75985 CPT(R)] CULTURE, URINE [id number] CPT(R)] LDL, DIRECT [id number] CPT(R)] METABOLIC PANEL, COMPREHENSIVE [66212 CPT(R)] PSA, DIAGNOSTIC (PROSTATE SPECIFIC AG) [id number] CPT(R)] REFERRAL TO NEUROLOGY [JCZ29 Custom] Comments: Follow up of meningioma and h/o seizures (last Sz 2014) TSH 3RD GENERATION [15381 CPT(R)] Referral Information Referral ID Referred By Referred To  
  
 [id number] Bertrand Becerril MD   
   [address] [facility] [address] [facility] [address] Phone: [phone] Fax: [phone] Visits Status Start Date End Date 1 New Request 2/21/18 2/21/19 If your referral has a status of pending review or denied, additional information will be sent to support the outcome of this decision. Patient Instructions See Dr. Maco Hester for neurology follow up Please call Jose Sommer to help arrange and authorize any tests and/or referrals. Her # is [phone] Consider vaccine for shingles and PCV 13 9Rx given to obtain at local pharmacy like [facility]) Stay active Try Flomax for your prostate. Remember that your nose and prostate are opposite. Whatever opens your prostate can make your nose stuffy. Whatever opens your nose can clog up the prostate (like cold and allergy medicines:  Nasal sprays are OK however). Several other things like tea, coffee, and other medicines including pain medicines can make urination difficult. Just be aware of this and use Flomax accordingly Introducing [facility]! Dear Mary Simms: Thank you for requesting a [facility] account. Our records indicate that you already have an active [facility] account. You can access your account anytime at [email or web address] Did you know that you can access your hospital and ER discharge instructions at any time in [facility]?   You can also review all of your test results from your hospital stay or ER visit. Additional Information If you have questions, please visit the Frequently Asked Questions section of the [employer] website at [email or web address] ([email or web address] Remember, [facility] is NOT to be used for urgent needs. For medical emergencies, dial 911. Now available from your iPhone and Android! Please provide this summary of care documentation to your next provider. Your primary care clinician is listed as Tasha Agudelo. If you have any questions after today's visit, please call [phone].

## 2018-02-21 NOTE — LETTER
2/22/2018 9:58 AM 
 
Mr. Corine Pickering [address] [place] [address] Dear Corine Pickering: 
 
Please find your most recent results below. Resulted Orders PSA, DIAGNOSTIC (PROSTATE SPECIFIC AG) Result Value Ref Range Prostate Specific Ag 9.6 (H) 0.0 - 4.0 ng/mL Comment:  
   Roche ECLIA methodology. According to the American Urological Association, Serum PSA should 
decrease and remain at undetectable levels after radical 
prostatectomy. The AUA defines biochemical recurrence as an initial 
PSA value 0.2 ng/mL or greater followed by a subsequent confirmatory PSA value 0.2 ng/mL or greater. Values obtained with different assay methods or kits cannot be used 
interchangeably. Results cannot be interpreted as absolute evidence 
of the presence or absence of malignant disease. Narrative Performed at:  [address]  [id number] : Casper Tolbert MD, Phone:  [phone] CBC W/O DIFF Result Value Ref Range WBC 6.3 3.4 - 10.8 x10E3/uL  
 RBC 5.07 4. 14 - 5.80 x10E6/uL HGB 15.5 13.0 - 17.7 g/dL HCT 45.0 37.5 - 51.0 % MCV 89 79 - 97 fL  
 MCH 30.6 26.6 - 33.0 pg  
 MCHC 34.4 31.5 - 35.7 g/dL  
 RDW 14.2 12.3 - 15.4 % PLATELET 136 359 - 410 x10E3/uL Narrative Performed at:  [address]  [id number] : Casper Tolbert MD, Phone:  [phone] METABOLIC PANEL, COMPREHENSIVE Result Value Ref Range Glucose 103 (H) 65 - 99 mg/dL BUN 25 8 - 27 mg/dL Creatinine 0.97 0.76 - 1.27 mg/dL GFR est non-AA 76 >59 GFR est AA 88 >59 BUN/Creatinine ratio 26 (H) 10 - 24 Sodium 137 134 - 144 mmol/L Potassium 4.7 3.5 - 5.2 mmol/L Chloride 100 96 - 106 mmol/L  
 CO2 24 18 - 29 mmol/L Calcium 9.4 8.6 - 10.2 mg/dL Protein, total 6.8 6.0 - 8.5 g/dL Albumin 4.3 3.5 - 4.8 g/dL GLOBULIN, TOTAL 2.5 1.5 - 4.5 A-G Ratio 1.7 1.2 - 2.2 Bilirubin, total 0.4 0.0 - 1.2 mg/dL Alk. phosphatase 77 39 - 117 IU/L  
 AST (SGOT) 17 0 - 40 IU/L  
 ALT (SGPT) 15 0 - 44 IU/L Narrative Performed at:  [address]  [id number] : Lina Elizabeth MD, Phone:  [phone] TSH 3RD GENERATION Result Value Ref Range TSH 2.920 0.450 - 4.500 uIU/mL Narrative Performed at:  [address]  [id number] : Lina Elizabeth MD, Phone:  [phone] RECOMMENDATIONS: 
 
Your PSA is up. Start the uroxetral as we discussed.  I also suggest doxycycline 100 mg daily for several weeks.  Take this with food  
 
 I also suggest you see a urologist.    
 
Please call [facility] to help arrange and authorize any tests and/or referrals. Dylan Akhtar # [phone] Sincerely, Yadira Barragan MD

## 2018-02-21 NOTE — PROGRESS NOTES
Chief Complaint   Patient presents with    Annual Wellness Visit     patient fasting      1. Have you been to the ER, urgent care clinic since your last visit? Hospitalized since your last visit? No    2. Have you seen or consulted any other health care providers outside of the [address] [place] [address] since your last visit? Include any pap smears or colon screening. No     Reviewed record in preparation for visit and have obtained necessary documentation.

## 2018-02-21 NOTE — PROGRESS NOTES
Piper Chau is a 78 y.o. male      Issues discussed today include:    He is OFF Synthroid for one year    Signs and symptoms:  nocturia  Duration:  weeks  Context:  No longer taking flomax  Location:  BPH  Quality:  prostatism  Severity:  No fevers  Timing:  Wax wane  Modifying factors:  Resume flomax, labs today    Data reviewed or ordered today:  Fasting labs    WELLNESS     PSA:  2018  AAA screen:   Done 2014 (no AAA on CT scanning)  Screening chest CT scan: Former smoker, quit age 24    Hearing screen:   done  Vision screening:   done  Depression screening:   done  Fall assessment:   done      We discussed health maintenance    BMI = Body mass index is 34.06 kg/(m^2). See letter    We discussed diet/exercise/healthy weight    We discussed avoiding tobacco products     We reviewed and updated pertinent past medical history in the problem list      Colonoscopy:  2014  FOBT:  2018  TDAP:  2014  Pneumovax:  2014  PCV-13:  Rx 2018  Flu shot:  Declined 2018  Zostavax:  Rx 2018  Eye exam:  Years ago  EKG: On file    FTF for DME:  done:  BiPap 13/8. He had titration study 8/11/2017 Dr. Marisela Frank directive:  Full code, wife would be decision-maker if needed  Specialists:  [facility] [address]    In general, I advise patients to be as active as possible. I believe exercise is the key to long life and good health. The current recommendation is for individuals to exercise for 150 minutes each week (in other words 30 minutes 5 days a week). Exercise should be vigorous enough to work up a sweat. These activities include brisk walking, running, tennis, swimming, weight-lifting, etc.     I usually tell folks that work is work and exercise is exercise. Each of these activities has a different goal.  Even though you may be active at work, it may not be aerobically adequate. So build dedicated exercise time into your weekly routine.     If any patient drinks alcohol, I suggest that a male drink only 2 beers (or glasses of wine, or shots of liquor) in any 24 hour period ( and not daily). For females, the limits are one drink per 24 hours (and not daily). After these limits, the toxic effects of alcohol consumption start to manifest.     Avoid tobacco products. I may suggest specific smoking cessation instructions if needed. I typically suggest a wellness exam yearly during your birth month to update health maintenance issues such as fasting labs, EKGs and other studies, appropriate cancer screenings,  female exams as appropriate, immunizations, etc.    I suggest a yearly flu shot    Please call Alberto Yang to help arrange and authorize any tests or referrals. Her # is [phone]         Other problems include:  Patient Active Problem List   Diagnosis Code    BPH (benign prostatic hypertrophy) N40.0    Hypothyroidism E03.9    Seizure ([facility] R56.9    Meningioma ([facility] D32.9    RACHEL (obstructive sleep apnea) G47.33    H/O colonoscopy Z98.890    Osteoarthritis M19.90       Medications:  Current Outpatient Prescriptions   Medication Sig Dispense Refill    tamsulosin (FLOMAX) 0.4 mg capsule Take 1 Cap by mouth daily. 30 Cap 1    rosuvastatin (CRESTOR) 5 mg tablet Take 1 Tab by mouth nightly. 30 Tab 3    ascorbic acid, vitamin C, (VITAMIN C) 250 mg tablet Take  by mouth.  multivitamin (ONE A DAY) tablet Take 1 Tab by mouth daily.  levothyroxine (SYNTHROID) 50 mcg tablet TAKE 1 TABLET BY MOUTH EVERY MORNING AT 7AM 30 Tab 5       Allergies: Allergies   Allergen Reactions    Bactrim [Sulfamethoprim Ds] Seizures    Sulfa (Sulfonamide Antibiotics) Other (comments)     Not sure what happens was taking during time of seizure and other brothers are allergic. LMP:  No LMP for male patient. Social History     Social History    Marital status:      Spouse name: N/A    Number of children: N/A    Years of education: N/A     Occupational History    Not on file.      Social History Main Topics    Smoking status: Former Smoker    Smokeless tobacco: Never Used      Comment: Quit 40 years ago    Alcohol use No    Drug use: No    Sexual activity: Yes     Partners: Female     Other Topics Concern    Not on file     Social History Narrative         Family History   Problem Relation Age of Onset    Heart Disease Mother     Heart Disease Father      Other family history:  rheumatism    Meaningful use:  done      ROS:  Headaches:  no  Chest Pain:  no  SOB:  no  Fevers:  no  Falls:  no  anxiety/depression/losing interest in doing things that were previously enjoyed:  no. PHQ2 = 0  Other significant ROS:  Arthritis pain both hands and knees  Patient denied problems with:    Hearing/vision, speaking/swallowing, Reflux/indigestion, Cough,Diarrhea/constipation, Mood (anxiety/depression/losing interest in doing things that were previously enjoyed), Snoring/sleep apnea (he is on BiPap),Fatigue, Weight change, memory                                                         Any other Positive ROS include: nocturia        Falls in the past 12 months:  no           Over the last year (or since your last visit):  Have you been diagnosed with heart attack, stroke, broken bones, or skin cancer = no    Exercise: Works hard in job             Smoking history: Former, quit age 24                                    No LMP for male patient.     Physical Exam  Visit Vitals    /73 (BP 1 Location: Right arm, BP Patient Position: Sitting)    Pulse (!) 56    Temp 98.4 °F (36.9 °C) (Oral)    Resp 16    Ht 5' 6\" (1.676 m)    Wt 211 lb (95.7 kg)    SpO2 96%    BMI 34.06 kg/m2     BP Readings from Last 3 Encounters:   02/21/18 133/73   08/11/17 123/83   06/20/17 137/80     Constitutional:  Appears well,  No Acute Distress, Vitals noted  Psychiatric:   Affect normal, Alert and cooperative, Oriented to person/place/time    Eyes:   Pupils equally round and reactive, EOMI, conjunctiva clear, eyelids normal  ENT: External ears and nose normal/lips, teeth=OK/gums normal, TMs and Orophyarynx normal  Neck:   general inspection and Thyroid normal.  No abnormal cervical or supraclavicular nodes    Lungs:   clear to auscultation, good respiratory effort  Heart: Ausculation normal.  Regular rhythm. No cardiac murmurs. No carotid bruits or palpable thrills  Chest wall normal  Abd:  benign  Extremities:   without edema, good peripheral pulses  Skin:   Warm to palpation, without rashes, bruising, or suspicious lesions   : Both testicles descended, no hernia, we discussed the USPSTF guidelines on prostate cancer screening and physical exam of the prostate was not done    Neuro:  No facial droop, speech fluent, EOMI, Pupils equally round and reactive to light, visual fields seem OK, hearing seems normal and symmetrical,smile symmetrical, puffs out cheeks symmetrically    Shoulder shrug symmetrical     moves all extremities, strength/sensation seems intact and symmetrical    Rapid alternating movements of hands normal and symmetrical    balance seems OK, no pronator drift, gait normal. \"get up and go\" test OK    squats OK, heel standing/toe standing OK    no tenderness of C spine, T spine, LS spine, flexion/extension of spine OK    Affect seems appropriate, no obvious mental processing problems    MSK:  Full passive ROM all joints but painful ROM of knees, OA both hands              Assessment:    Patient Active Problem List   Diagnosis Code    BPH (benign prostatic hypertrophy) N40.0    Hypothyroidism E03.9    Seizure ([address] R56.9    Meningioma ([address] D32.9    RACHEL (obstructive sleep apnea) G47.33    H/O colonoscopy Z98.890    Osteoarthritis M19.90       Today's diagnoses are:    ICD-10-CM ICD-9-CM    1. Health care maintenance Z00.00 V70.0    2. RACHEL (obstructive sleep apnea) G47.33 327.23 CBC W/O DIFF      METABOLIC PANEL, COMPREHENSIVE    FTF for BiPap   3.  Need for shingles vaccine Z23 V04.89     Rx given for singrix and PCV 13 (handwritten, see scan)   4. Osteoarthritis, unspecified osteoarthritis type, unspecified site M19.90 715.90    5. Hypothyroidism, unspecified type E03.9 244.9 TSH 3RD GENERATION    he is OFF Synthroid 50 mcg for months   6. Meningioma (HCC) D32.9 225.2 REFERRAL TO NEUROLOGY   7. Seizure ([address] R56.9 780.39 REFERRAL TO NEUROLOGY   8. Nocturia associated with benign prostatic hyperplasia N40.1 600.01 CULTURE, URINE    R35.1 788.43 PSA, DIAGNOSTIC (PROSTATE SPECIFIC AG)      AMB POC URINE, MICROALBUMIN, SEMIQUANT (3 RESULTS)   9. Lipid screening Z13.220 V77.91 LDL, DIRECT   10. Prostatitis, unspecified prostatitis type N41.9 601.9 tamsulosin (FLOMAX) 0.4 mg capsule   11. Lipid disorder E78.9 272.9 rosuvastatin (CRESTOR) 5 mg tablet       Plan:  Orders Placed This Encounter    CULTURE, URINE    PROSTATE SPECIFIC AG    CBC W/O DIFF    METABOLIC PANEL, COMPREHENSIVE    TSH 3RD GENERATION    LDL, DIRECT    REFERRAL TO NEUROLOGY     Referral Priority:   Routine     Referral Type:   Consultation     Referral Reason:   Specialty Services Required     Referred to Provider:   Jacques Hernandez MD     Requested Specialty:   Neurology    AMB POC URINE, MICROALBUMIN, SEMIQUANT (3 RESULTS)    tamsulosin (FLOMAX) 0.4 mg capsule     Sig: Take 1 Cap by mouth daily. Dispense:  30 Cap     Refill:  1    rosuvastatin (CRESTOR) 5 mg tablet     Sig: Take 1 Tab by mouth nightly. Dispense:  30 Tab     Refill:  3       See patient instructions  Patient Instructions   See Dr. Latasha Jones for neurology follow up    Please call Justa to help arrange and authorize any tests and/or referrals. Her # is [phone]     Consider vaccine for shingles and PCV 13 9Rx given to obtain at local pharmacy like [facility])    Stay active    Try Flomax for your prostate. Remember that your nose and prostate are opposite. Whatever opens your prostate can make your nose stuffy.   Whatever opens your nose can clog up the prostate (like cold and allergy medicines:  Nasal sprays are OK however). Several other things like tea, coffee, and other medicines including pain medicines can make urination difficult. Just be aware of this and use Flomax accordingly            refresh note:  done    AVS Printed:  done      Diagnoses and all orders for this visit:    1. Health care maintenance    2. RACHEL (obstructive sleep apnea)  Comments:  FTF for BiPap  Orders:  -     CBC W/O DIFF  -     METABOLIC PANEL, COMPREHENSIVE    3. Need for shingles vaccine  Comments:  Rx given for singrix and PCV 13 (handwritten, see scan)    4. Osteoarthritis, unspecified osteoarthritis type, unspecified site    5. Hypothyroidism, unspecified type  Comments:  he is OFF Synthroid 50 mcg for months  Orders:  -     TSH 3RD GENERATION    6. Meningioma [facility])  Assessment & Plan: This condition is managed by Specialist.  Key Oncology Meds     Patient is on no Oncologic meds. Key Pain Meds     The patient is on no pain meds. Lab Results   Component Value Date/Time    WBC 7.4 06/06/2017 07:08 PM    ABS. NEUTROPHILS 5.8 06/06/2017 07:08 PM    HGB 14.1 06/06/2017 07:08 PM    HCT 41.3 06/06/2017 07:08 PM    PLATELET 305 03/76/3675 07:08 PM    Creatinine 1.40 06/06/2017 07:08 PM    BUN 24 06/06/2017 07:08 PM    ALT (SGPT) 18 06/06/2017 07:08 PM    AST (SGOT) 18 06/06/2017 07:08 PM    Albumin 3.8 06/06/2017 07:08 PM    Prostate Specific Ag 9.7 02/07/2017 04:31 PM       Orders:  -     REFERRAL TO NEUROLOGY    7. Seizure ([address]  Assessment & Plan: This condition is managed by Specialist.  Lab Results   Component Value Date/Time    WBC 7.4 06/06/2017 07:08 PM    HGB 14.1 06/06/2017 07:08 PM    HCT 41.3 06/06/2017 07:08 PM    PLATELET 856 15/05/0660 07:08 PM    Creatinine 1.40 06/06/2017 07:08 PM    BUN 24 06/06/2017 07:08 PM    Potassium 4.1 06/06/2017 07:08 PM       Orders:  -     REFERRAL TO NEUROLOGY    8.  Nocturia associated with benign prostatic hyperplasia  -     CULTURE, URINE  - PROSTATE SPECIFIC AG  -     AMB POC URINE, MICROALBUMIN, SEMIQUANT (3 RESULTS)    9. Lipid screening  -     LDL, DIRECT    10. Prostatitis, unspecified prostatitis type  -     tamsulosin (FLOMAX) 0.4 mg capsule; Take 1 Cap by mouth daily. 11. Lipid disorder  -     rosuvastatin (CRESTOR) 5 mg tablet; Take 1 Tab by mouth nightly. DME Requirements:    Patient name:  Gay Naik    Patient :  1940    Item of DME ordered:  BiPap supplies    Prescriber:  Dr. Avila Current, TS#[id number]    Date of order:  2018    Date fo Face to Face Encounter documenting need for DME:  2018      ICD-10-CM ICD-9-CM    1. Health care maintenance Z00.00 V70.0    2. RACHEL (obstructive sleep apnea) G47.33 327.23 CBC W/O DIFF      METABOLIC PANEL, COMPREHENSIVE    FTF for BiPap   3. Need for shingles vaccine Z23 V04.89     Rx given for singrix and PCV 13 (handwritten, see scan)   4. Osteoarthritis, unspecified osteoarthritis type, unspecified site M19.90 715.90    5. Hypothyroidism, unspecified type E03.9 244.9 TSH 3RD GENERATION    he is OFF Synthroid 50 mcg for months   6. Meningioma (HCC) D32.9 225.2 REFERRAL TO NEUROLOGY   7. Seizure ([address] R56.9 780.39 REFERRAL TO NEUROLOGY   8. Nocturia associated with benign prostatic hyperplasia N40.1 600.01 CULTURE, URINE    R35.1 788.43 PSA, DIAGNOSTIC (PROSTATE SPECIFIC AG)      AMB POC URINE, MICROALBUMIN, SEMIQUANT (3 RESULTS)   9. Lipid screening Z13.220 V77.91 LDL, DIRECT   10. Prostatitis, unspecified prostatitis type N41.9 601.9 tamsulosin (FLOMAX) 0.4 mg capsule   11.  Lipid disorder E78.9 272.9 rosuvastatin (CRESTOR) 5 mg tablet       Orders Placed This Encounter    CULTURE, URINE    PROSTATE SPECIFIC AG    CBC W/O DIFF    METABOLIC PANEL, COMPREHENSIVE    TSH 3RD GENERATION    LDL, DIRECT    REFERRAL TO NEUROLOGY     Referral Priority:   Routine     Referral Type:   Consultation     Referral Reason:   Specialty Services Required     Referred to Provider: Zia Reyes MD     Requested Specialty:   Neurology    AMB POC URINE, MICROALBUMIN, SEMIQUANT (3 RESULTS)    tamsulosin (FLOMAX) 0.4 mg capsule     Sig: Take 1 Cap by mouth daily. Dispense:  30 Cap     Refill:  1    rosuvastatin (CRESTOR) 5 mg tablet     Sig: Take 1 Tab by mouth nightly.      Dispense:  30 Tab     Refill:  3

## 2018-02-21 NOTE — ASSESSMENT & PLAN NOTE
This condition is managed by Specialist.  Lab Results   Component Value Date/Time    WBC 7.4 06/06/2017 07:08 PM    HGB 14.1 06/06/2017 07:08 PM    HCT 41.3 06/06/2017 07:08 PM    PLATELET 545 63/12/0718 07:08 PM    Creatinine 1.40 06/06/2017 07:08 PM    BUN 24 06/06/2017 07:08 PM    Potassium 4.1 06/06/2017 07:08 PM

## 2018-02-21 NOTE — PATIENT INSTRUCTIONS
See Dr. Twyla Doll for neurology follow up    Please call Chitra Lynch to help arrange and authorize any tests and/or referrals. Her # is [phone]     Consider vaccine for shingles and PCV 13 9Rx given to obtain at local pharmacy like [facility])    Stay active    Try Flomax for your prostate. Remember that your nose and prostate are opposite. Whatever opens your prostate can make your nose stuffy. Whatever opens your nose can clog up the prostate (like cold and allergy medicines:  Nasal sprays are OK however). Several other things like tea, coffee, and other medicines including pain medicines can make urination difficult.   Just be aware of this and use Flomax accordingly

## 2018-02-22 DIAGNOSIS — R35.1 NOCTURIA ASSOCIATED WITH BENIGN PROSTATIC HYPERPLASIA: ICD-10-CM

## 2018-02-22 DIAGNOSIS — N41.9 PROSTATITIS, UNSPECIFIED PROSTATITIS TYPE: Primary | ICD-10-CM

## 2018-02-22 DIAGNOSIS — N40.1 NOCTURIA ASSOCIATED WITH BENIGN PROSTATIC HYPERPLASIA: ICD-10-CM

## 2018-02-22 DIAGNOSIS — R97.20 ELEVATED PSA: ICD-10-CM

## 2018-02-22 LAB
ALBUMIN SERPL-MCNC: 4.3 G/DL (ref 3.5–4.8)
ALBUMIN/GLOB SERPL: 1.7 {RATIO} (ref 1.2–2.2)
ALP SERPL-CCNC: 77 IU/L (ref 39–117)
ALT SERPL-CCNC: 15 IU/L (ref 0–44)
AST SERPL-CCNC: 17 IU/L (ref 0–40)
BACTERIA UR CULT: NO GROWTH
BILIRUB SERPL-MCNC: 0.4 MG/DL (ref 0–1.2)
BUN SERPL-MCNC: 25 MG/DL (ref 8–27)
BUN/CREAT SERPL: 26 (ref 10–24)
CALCIUM SERPL-MCNC: 9.4 MG/DL (ref 8.6–10.2)
CHLORIDE SERPL-SCNC: 100 MMOL/L (ref 96–106)
CO2 SERPL-SCNC: 24 MMOL/L (ref 18–29)
CREAT SERPL-MCNC: 0.97 MG/DL (ref 0.76–1.27)
ERYTHROCYTE [DISTWIDTH] IN BLOOD BY AUTOMATED COUNT: 14.2 % (ref 12.3–15.4)
GFR SERPLBLD CREATININE-BSD FMLA CKD-EPI: 76 ML/MIN/{1.73_M2}
GFR SERPLBLD CREATININE-BSD FMLA CKD-EPI: 88 ML/MIN/{1.73_M2}
GLOBULIN SER CALC-MCNC: 2.5 G/L (ref 1.5–4.5)
GLUCOSE SERPL-MCNC: 103 MG/DL (ref 65–99)
HCT VFR BLD AUTO: 45 % (ref 37.5–51)
HGB BLD-MCNC: 15.5 G/DL (ref 13–17.7)
MCH RBC QN AUTO: 30.6 PG (ref 26.6–33)
MCHC RBC AUTO-ENTMCNC: 34.4 G/DL (ref 31.5–35.7)
MCV RBC AUTO: 89 FL (ref 79–97)
PLATELET # BLD AUTO: 209 X10E3/UL (ref 150–379)
POTASSIUM SERPL-SCNC: 4.7 MMOL/L (ref 3.5–5.2)
PROT SERPL-MCNC: 6.8 G/DL (ref 6–8.5)
PSA SERPL-MCNC: 9.6 NG/ML (ref 0–4)
RBC # BLD AUTO: 5.07 X10E6/UL (ref 4.14–5.8)
SODIUM SERPL-SCNC: 137 MMOL/L (ref 134–144)
TSH SERPL DL<=0.005 MIU/L-ACNC: 2.92 UIU/ML (ref 0.45–4.5)
WBC # BLD AUTO: 6.3 X10E3/UL (ref 3.4–10.8)

## 2018-02-22 RX ORDER — DOXYCYCLINE 100 MG/1
100 TABLET ORAL DAILY
Qty: 14 TAB | Refills: 1 | Status: SHIPPED | OUTPATIENT
Start: 2018-02-22 | End: 2018-03-08

## 2018-02-22 NOTE — PROGRESS NOTES
Your PSA is up. Start the uroxetral as we discussed. I also suggest doxycycline 100 mg daily for several weeks. Take this with food     I also suggest you see a urologist.      Please call [facility] to help arrange and authorize any tests and/or referrals.   Her # is [phone]

## 2018-02-23 LAB
ALBUMIN UR QL STRIP: 10 MG/L
CREATININE, URINE POC: 200 MG/DL
MICROALBUMIN/CREAT RATIO POC: <30 MG/G

## 2018-04-17 DIAGNOSIS — N40.1 NOCTURIA ASSOCIATED WITH BENIGN PROSTATIC HYPERPLASIA: ICD-10-CM

## 2018-04-17 DIAGNOSIS — R35.1 NOCTURIA ASSOCIATED WITH BENIGN PROSTATIC HYPERPLASIA: ICD-10-CM

## 2018-04-17 RX ORDER — ALFUZOSIN HYDROCHLORIDE 10 MG/1
TABLET, EXTENDED RELEASE ORAL
Qty: 90 TAB | Refills: 3 | Status: SHIPPED | OUTPATIENT
Start: 2018-04-17 | End: 2019-04-01 | Stop reason: SDUPTHER

## 2019-02-26 ENCOUNTER — OFFICE VISIT (OUTPATIENT)
Dept: FAMILY MEDICINE CLINIC | Age: 79
End: 2019-02-26

## 2019-02-26 VITALS
HEART RATE: 71 BPM | DIASTOLIC BLOOD PRESSURE: 68 MMHG | RESPIRATION RATE: 18 BRPM | BODY MASS INDEX: 33.75 KG/M2 | HEIGHT: 66 IN | SYSTOLIC BLOOD PRESSURE: 132 MMHG | TEMPERATURE: 97.8 F | WEIGHT: 210 LBS | OXYGEN SATURATION: 97 %

## 2019-02-26 DIAGNOSIS — Z86.39 HISTORY OF HYPOTHYROIDISM: ICD-10-CM

## 2019-02-26 DIAGNOSIS — Z23 NEED FOR SHINGLES VACCINE: ICD-10-CM

## 2019-02-26 DIAGNOSIS — R97.20 ELEVATED PSA: ICD-10-CM

## 2019-02-26 DIAGNOSIS — Z13.31 SCREENING FOR DEPRESSION: ICD-10-CM

## 2019-02-26 DIAGNOSIS — D32.9 MENINGIOMA (HCC): ICD-10-CM

## 2019-02-26 DIAGNOSIS — Z12.5 SPECIAL SCREENING FOR MALIGNANT NEOPLASM OF PROSTATE: ICD-10-CM

## 2019-02-26 DIAGNOSIS — Z00.00 MEDICARE ANNUAL WELLNESS VISIT, SUBSEQUENT: Primary | ICD-10-CM

## 2019-02-26 DIAGNOSIS — Z13.39 SCREENING FOR ALCOHOLISM: ICD-10-CM

## 2019-02-26 NOTE — PROGRESS NOTES
Chief Complaint   Patient presents with   [address] Questions   -During the past 4 weeks:   -how would you rate your health in general? Good   -how often have you been bothered by feeling dizzy when standing up? Mildly   -how much have you been bothered by bodily pain? not   -Have you noticed any hearing difficulties? yes   -has your physical and emotional health limited your social activities with family or friends? no    Emotional Health Questions   -Do you have a history of depression, anxiety, or emotional problems? no  -Over the past 2 weeks, have you felt down, depressed or hopeless? no  -Over the past 2 weeks, have you felt little interest or pleasure in doing things? no    Health Habits   Please describe your diet habits: Overall well balanced  Do you get 5 servings of fruits or vegetables daily? no  Do you exercise regularly? no    Activities of Daily Living and Functional Status   -Do you need help with eating, walking, dressing, bathing, toileting, the phone, transportation, shopping, preparing meals, housework, laundry, medications or managing money? no  -In the past four weeks, was someone available to help you if you needed and wanted help with anything? yes  -Are you confident are you that you can control and manage most of your health problems? yes  -Have you been given information to help you keep track of your medications? yes  -How often do you have trouble taking your medications as prescribed? never    Fall Risk and Home Safety   Have you fallen 2 or more times in the past year? no  Does your home have rugs in the hallways? yes, Do you have grab bars in the bathrooms?  no, Does your home have handrails on the stairs? yes, Do you have adequate lighting in your home? yes  Do you have smoke detectors and check them regularly?  yes  Do you have difficulties driving a car/vehicle? no  Do you always fasten your seat belt when you are in a car? yes

## 2019-02-26 NOTE — PROGRESS NOTES
This is the Subsequent Medicare Annual Wellness Exam, performed 12 months or more after the Initial AWV or the last Subsequent AWV    I have reviewed the patient's medical history in detail and updated the computerized patient record. History     Feeling well. No concerns. Pt shares he has not followed up with urology for elevated PSA. No change in urinary stream, no weight loss, fevers. He has stopped crestor as he developed myalgia. No seizures noted. PSA:  today  AAA screen:   Done 2014 (no AAA on CT scanning)  Screening chest CT scan: Former smoker, quit age 24     Hearing screen:   done  Vision screening:   done  Depression screening:   done  Fall assessment:   done     BMI = Body mass index is 33.89 kg/(m^2).     Colonoscopy:  2014  TDAP:  2014  Pneumovax:  2014  PCV-13:  Rx 2018 but did not get  Flu shot:  Declined 2019  Zostavax:  Rx 2018 but did not get  Eye exam:  Years ago  EKG: On file     FTF for DME:  done:  BiPap 13/8. He had titration study 8/11/2017 Dr. Jerome Hernandez directive:  Full code, wife would be decision-maker if needed  Specialists:  [facility] [address]    Past Medical History:   Diagnosis Date    Seizures ([facility]     Sleep apnea 2008      Past Surgical History:   Procedure Laterality Date    KNEE ARTHROSCP HARV      right      Current Outpatient Medications   Medication Sig Dispense Refill    varicella-zoster recombinant, PF, (SHINGRIX, PF,) 50 mcg/0.5 mL susr injection 0.5mL by IntraMUSCular route once now and then repeat in 2-6 months 0.5 mL 1    alfuzosin SR (UROXATRAL) 10 mg SR tablet TAKE 1 TABLET BY MOUTH DAILY 90 Tab 3    rosuvastatin (CRESTOR) 5 mg tablet Take 1 Tab by mouth nightly. 30 Tab 3    ascorbic acid, vitamin C, (VITAMIN C) 250 mg tablet Take  by mouth.  multivitamin (ONE A DAY) tablet Take 1 Tab by mouth daily.       levothyroxine (SYNTHROID) 50 mcg tablet TAKE 1 TABLET BY MOUTH EVERY MORNING AT 7AM 30 Tab 5 Allergies   Allergen Reactions    Bactrim [Sulfamethoprim Ds] Seizures    Sulfa (Sulfonamide Antibiotics) Other (comments)     Not sure what happens was taking during time of seizure and other brothers are allergic. Family History   Problem Relation Age of Onset    Heart Disease Mother     Heart Disease Father      Social History     Tobacco Use    Smoking status: Former Smoker    Smokeless tobacco: Never Used    Tobacco comment: Quit 40 years ago   Substance Use Topics    Alcohol use: No     Patient Active Problem List   Diagnosis Code    Nocturia associated with benign prostatic hyperplasia N40.1, R35.1    Hypothyroidism E03.9    Seizure ([address] R56.9    Meningioma (HCC) D32.9    RACHEL (obstructive sleep apnea) G47.33    H/O colonoscopy Z98.890    Osteoarthritis M19.90    Elevated PSA R97.20       Depression Risk Factor Screening:     3 most recent PHQ Screens 2/21/2018   Little interest or pleasure in doing things Not at all   Feeling down, depressed, irritable, or hopeless Not at all   Total Score PHQ 2 0     Alcohol Risk Factor Screening: You do not drink alcohol or very rarely. Functional Ability and Level of Safety:   Hearing Loss  Hearing is good. Activities of Daily Living  The home contains: rugs and poor lighting  Patient does total self care    Fall Risk  Fall Risk Assessment, last 12 mths 2/21/2018   Able to walk? Yes   Fall in past 12 months? No       Abuse Screen  Patient is not abused    Cognitive Screening   Evaluation of Cognitive Function:  Has your family/caregiver stated any concerns about your memory: no  Normal    Patient Care Team   Patient Care Team:  Dana Cardoso DO as PCP - General (Family Practice)    Assessment/Plan   Education and counseling provided:  Are appropriate based on today's review and evaluation    Diagnoses and all orders for this visit:    1.  Medicare annual wellness visit, subsequent  -     REFERRAL TO OPHTHALMOLOGY for eye exam  - CBC W/O DIFF  -     pneumococcal 13 marc conj dip (PREVNAR-13) 0.5 mL syrg injection; 0.5 mL by IntraMUSCular route once for 1 dose. 2. Need for shingles vaccine  -     varicella-zoster recombinant, PF, (SHINGRIX, PF,) 50 mcg/0.5 mL susr injection; 0.5mL by IntraMUSCular route once now and then repeat in 2-6 months    3. Screening for alcoholism  -     UT ANNUAL ALCOHOL SCREEN 15 MIN    4. Screening for depression  -     DEPRESSION SCREEN ANNUAL PHQ9 scoree 0    5. Special screening for malignant neoplasm of prostate  -     UT PROSTATE CA SCREENING; MAVIS  -     PSA SCREENING (SCREENING)  -  REFERRAL TO UROLOGY    6. History of hypothyroidism  -     TSH RFX ON ABNORMAL TO FREE T4    7. Elevated PSA  -     REFERRAL TO UROLOGY  -     METABOLIC PANEL, COMPREHENSIVE    8. Meningioma ([address]- No recent seizures. He was has failed to follow up with neurology and plans to return to visit them.   -     REFERRAL TO NEUROLOGY      We discussed diet/exercise/healthy weight     We discussed avoiding tobacco products      We reviewed and updated pertinent past medical history in the problem list    He refused influenza vaccine    Health Maintenance Due   Topic Date Due    Shingrix Vaccine Age 49> (1 of 2) 11/28/1992    Influenza Age 5 to Adult  08/01/2018    GLAUCOMA SCREENING Q2Y  02/07/2019    MEDICARE YEARLY EXAM  02/22/2019     Discussed with attending    Brad Saunders DO

## 2019-02-26 NOTE — PATIENT INSTRUCTIONS
Medicare Wellness Visit, Male    The best way to live healthy is to have a lifestyle where you eat a well-balanced diet, exercise regularly, limit alcohol use, and quit all forms of tobacco/nicotine, if applicable. Regular preventive services are another way to keep healthy. Preventive services (vaccines, screening tests, monitoring & exams) can help personalize your care plan, which helps you manage your own care. Screening tests can find health problems at the earliest stages, when they are easiest to treat. [facility] follows the current, evidence-based guidelines published by the [facility]av ([facility]) when recommending preventive services for our patients. Because we follow these guidelines, sometimes recommendations change over time as research supports it. (For example, a prostate screening blood test is no longer routinely recommended for men with no symptoms.)  Of course, you and your doctor may decide to screen more often for some diseases, based on your risk and co-morbidities (chronic disease you are already diagnosed with). Preventive services for you include:  - Medicare offers their members a free annual wellness visit, which is time for you and your primary care provider to discuss and plan for your preventive service needs. Take advantage of this benefit every year!  -All adults over age 72 should receive the recommended pneumonia vaccines. Current USPSTF guidelines recommend a series of two vaccines for the best pneumonia protection.   -All adults should have a flu vaccine yearly and an ECG.  All adults age 61 and older should receive a shingles vaccine once in their lifetime.    -All adults age 38-68 who are overweight should have a diabetes screening test once every three years.   -Other screening tests & preventive services for persons with diabetes include: an eye exam to screen for diabetic retinopathy, a kidney function test, a foot exam, and stricter control over your cholesterol.   -Cardiovascular screening for adults with routine risk involves an electrocardiogram (ECG) at intervals determined by the provider.   -Colorectal cancer screening should be done for adults age 54-65 with no increased risk factors for colorectal cancer. There are a number of acceptable methods of screening for this type of cancer. Each test has its own benefits and drawbacks. Discuss with your provider what is most appropriate for you during your annual wellness visit. The different tests include: colonoscopy (considered the best screening method), a fecal occult blood test, a fecal DNA test, and sigmoidoscopy.  -All adults born between [facility] should be screened once for Hepatitis C.  -An Abdominal Aortic Aneurysm (AAA) Screening is recommended for men age 73-68 who has ever smoked in their lifetime.      Here is a list of your current Health Maintenance items (your personalized list of preventive services) with a due date:  Health Maintenance Due   Topic Date Due    Shingles Vaccine (1 of 2) 11/28/1992    Flu Vaccine  08/01/2018    Glaucoma Screening   02/07/2019    Annual Well Visit  02/22/2019

## 2019-02-26 NOTE — PROGRESS NOTES
70year old male here for Medicare Wellness visit. Former smoker - stopped 24years old    F/U with urology    I reviewed with the resident the medical history and the resident's findings on the physical examination. I discussed with the resident the patient's diagnosis and concur with the plan.

## 2019-02-27 LAB
ALBUMIN SERPL-MCNC: 4.5 G/DL (ref 3.5–4.8)
ALBUMIN/GLOB SERPL: 1.9 {RATIO} (ref 1.2–2.2)
ALP SERPL-CCNC: 74 IU/L (ref 39–117)
ALT SERPL-CCNC: 9 IU/L (ref 0–44)
AST SERPL-CCNC: 15 IU/L (ref 0–40)
BILIRUB SERPL-MCNC: 0.4 MG/DL (ref 0–1.2)
BUN SERPL-MCNC: 21 MG/DL (ref 8–27)
BUN/CREAT SERPL: 22 (ref 10–24)
CALCIUM SERPL-MCNC: 9.5 MG/DL (ref 8.6–10.2)
CHLORIDE SERPL-SCNC: 102 MMOL/L (ref 96–106)
CO2 SERPL-SCNC: 24 MMOL/L (ref 20–29)
CREAT SERPL-MCNC: 0.94 MG/DL (ref 0.76–1.27)
ERYTHROCYTE [DISTWIDTH] IN BLOOD BY AUTOMATED COUNT: 14.5 % (ref 12.3–15.4)
GLOBULIN SER CALC-MCNC: 2.4 G/DL (ref 1.5–4.5)
GLUCOSE SERPL-MCNC: 95 MG/DL (ref 65–99)
HCT VFR BLD AUTO: 46.3 % (ref 37.5–51)
HGB BLD-MCNC: 15.6 G/DL (ref 13–17.7)
MCH RBC QN AUTO: 29.7 PG (ref 26.6–33)
MCHC RBC AUTO-ENTMCNC: 33.7 G/DL (ref 31.5–35.7)
MCV RBC AUTO: 88 FL (ref 79–97)
PLATELET # BLD AUTO: 209 X10E3/UL (ref 150–379)
POTASSIUM SERPL-SCNC: 4.9 MMOL/L (ref 3.5–5.2)
PROT SERPL-MCNC: 6.9 G/DL (ref 6–8.5)
PSA SERPL-MCNC: 11.7 NG/ML (ref 0–4)
RBC # BLD AUTO: 5.26 X10E6/UL (ref 4.14–5.8)
SODIUM SERPL-SCNC: 139 MMOL/L (ref 134–144)
TSH SERPL DL<=0.005 MIU/L-ACNC: 3.71 UIU/ML (ref 0.45–4.5)
WBC # BLD AUTO: 5.4 X10E3/UL (ref 3.4–10.8)

## 2019-03-01 NOTE — PROGRESS NOTES
Pt called and labs discussed. Pt will make urology appt. Understands the reason why urology needs to follow him for PSA. Offered to make appt for him but he declined and would like to make it himself.     Stacie García, DO

## 2019-03-05 ENCOUNTER — DOCUMENTATION ONLY (OUTPATIENT)
Dept: INTERNAL MEDICINE CLINIC | Age: 79
End: 2019-03-05

## 2019-03-08 ENCOUNTER — TELEPHONE (OUTPATIENT)
Dept: FAMILY MEDICINE CLINIC | Age: 79
End: 2019-03-08

## 2019-03-08 NOTE — TELEPHONE ENCOUNTER
appt is 3/15/19 at 10:20am    Dr. Evan Gomez    Ph [phone]    Please fax labs. notes as needed      Procedure Modifiers Provider Requested Approved   [id number] - REFERRAL TO UROLOGY None Steven Aburto MD 1 1   Diagnosis Information     Diagnosis   R97.20 (ICD-10-CM) - Elevated PSA

## 2019-03-12 ENCOUNTER — TELEPHONE (OUTPATIENT)
Dept: FAMILY MEDICINE CLINIC | Age: 79
End: 2019-03-12

## 2019-03-12 NOTE — TELEPHONE ENCOUNTER
----- Message from Doris Chisholm sent at 3/12/2019 10:47 AM EDT -----  Regarding: Pastora Clements / telephone   Wife of patient is calling to advise office that patient has an appt on March 15 at [facility] and needs the referral paper work and PSA test results to be FAX to ([phone]) Best contact [phone]

## 2019-04-01 DIAGNOSIS — N40.1 NOCTURIA ASSOCIATED WITH BENIGN PROSTATIC HYPERPLASIA: ICD-10-CM

## 2019-04-01 DIAGNOSIS — R35.1 NOCTURIA ASSOCIATED WITH BENIGN PROSTATIC HYPERPLASIA: ICD-10-CM

## 2019-04-01 RX ORDER — ALFUZOSIN HYDROCHLORIDE 10 MG/1
TABLET, EXTENDED RELEASE ORAL
Qty: 90 TAB | Refills: 0 | Status: SHIPPED | OUTPATIENT
Start: 2019-04-01 | End: 2019-07-05 | Stop reason: SDUPTHER

## 2019-04-16 ENCOUNTER — OFFICE VISIT (OUTPATIENT)
Dept: FAMILY MEDICINE CLINIC | Age: 79
End: 2019-04-16

## 2019-04-16 VITALS
WEIGHT: 209 LBS | HEART RATE: 62 BPM | TEMPERATURE: 97.8 F | OXYGEN SATURATION: 99 % | RESPIRATION RATE: 16 BRPM | SYSTOLIC BLOOD PRESSURE: 90 MMHG | BODY MASS INDEX: 33.59 KG/M2 | HEIGHT: 66 IN | DIASTOLIC BLOOD PRESSURE: 56 MMHG

## 2019-04-16 DIAGNOSIS — R19.7 DIARRHEA, UNSPECIFIED TYPE: Primary | ICD-10-CM

## 2019-04-16 RX ORDER — FINASTERIDE 5 MG/1
TABLET, FILM COATED ORAL
COMMUNITY
Start: 2019-04-09 | End: 2022-03-22

## 2019-04-16 NOTE — PROGRESS NOTES
Subjective:   Reyna Meng is an 70 y.o. male who presents for evaluation of diarrhea. HPI  Chief Complaint   Patient presents with    Diarrhea     He started to have non bloody diarrhea 2 weeks ago. Prior to that he was taking Cipro and Cefdinir x 5 days before his procedure ( prostate biopsy). He denies abdominal pain, carmping, nausea, vomiting and fever. He is having 4-5 watery bowel movements a day,  usually in the morning, never at night. He has not been taking anything for his symptoms. He has been tolerating diet and drinks plenty of fluids. Allergies - reviewed: Allergies   Allergen Reactions    Bactrim [Sulfamethoprim Ds] Seizures    Sulfa (Sulfonamide Antibiotics) Other (comments)     Not sure what happens was taking during time of seizure and other brothers are allergic. Medications - reviewed:  Current Outpatient Medications   Medication Sig    finasteride (PROSCAR) 5 mg tablet     alfuzosin SR (UROXATRAL) 10 mg SR tablet TAKE 1 TABLET BY MOUTH DAILY    rosuvastatin (CRESTOR) 5 mg tablet Take 1 Tab by mouth nightly.  ascorbic acid, vitamin C, (VITAMIN C) 250 mg tablet Take  by mouth.  multivitamin (ONE A DAY) tablet Take 1 Tab by mouth daily.  levothyroxine (SYNTHROID) 50 mcg tablet TAKE 1 TABLET BY MOUTH EVERY MORNING AT 7AM    varicella-zoster recombinant, PF, (SHINGRIX, PF,) 50 mcg/0.5 mL susr injection 0.5mL by IntraMUSCular route once now and then repeat in 2-6 months     No current facility-administered medications for this visit.           Past Medical History - reviewed:  Past Medical History:   Diagnosis Date    Seizures ([facility] [address]     Sleep apnea 2008         Family History - reviewed:  Family History   Problem Relation Age of Onset    Heart Disease Mother     Heart Disease Father          Social History - reviewed:  Social History     Socioeconomic History    Marital status:      Spouse name: Not on file    Number of children: Not on file    Years of education: Not on file    Highest education level: Not on file   Occupational History    Not on file   Social Needs    Financial resource strain: Not on file    Food insecurity:     Worry: Not on file     Inability: Not on file    Transportation needs:     Medical: Not on file     Non-medical: Not on file   Tobacco Use    Smoking status: Former Smoker    Smokeless tobacco: Never Used    Tobacco comment: Quit 40 years ago   Substance and Sexual Activity    Alcohol use: No    Drug use: No    Sexual activity: Yes     Partners: Female   Lifestyle    Physical activity:     Days per week: Not on file     Minutes per session: Not on file    Stress: Not on file   Relationships    Social connections:     Talks on phone: Not on file     Gets together: Not on file     Attends Zoroastrian service: Not on file     Active member of club or organization: Not on file     Attends meetings of clubs or organizations: Not on file     Relationship status: Not on file    Intimate partner violence:     Fear of current or ex partner: Not on file     Emotionally abused: Not on file     Physically abused: Not on file     Forced sexual activity: Not on file   Other Topics Concern    Not on file   Social History Narrative    Not on file         Review of Systems   CONSTITUTIONAL: denies fever. Denies chills. CARDIOVASCULAR: denies chest pain. Denies palpitations  RESPIRATORY: denies shortness of breath  GI: denies abdominal pain. +Diarrhea.        Objective:     Visit Vitals  BP 90/56   Pulse 62   Temp 97.8 °F (36.6 °C) (Oral)   Resp 16   Ht 5' 6\" (1.676 m)   Wt 209 lb (94.8 kg)   SpO2 99%   BMI 33.73 kg/m²       General appearance - alert, well appearing, and in no distress  Chest - clear to auscultation, no wheezes, rales or rhonchi, symmetric air entry  Heart - normal rate, regular rhythm, normal S1, S2, no murmurs, rubs, clicks or gallops  Abdomen - soft, nontender, nondistended, no masses or organomegaly  Skin - normal coloration and turgor, no rashes, no suspicious skin lesions noted      Assessment:   71 yo female who is here for:     ICD-10-CM ICD-9-CM    1. Diarrhea, unspecified type R19.7 787.91 C DIFFICILE TOXIN A & B BY EIA       Plan:   · The symptoms are likely due to recent antibiotic use however would like to exclude C.difficile. VSS, BP is on the lower side but MAP>65.   · Encouraged hydration, can take probiotics   · Avoid loperamide until testing comes back negative  · ER precautions were given       I have discussed the diagnosis with the patient and the intended plan as seen in the above orders. The patient has received an after-visit summary and questions were answered concerning future plans. I have discussed medication side effects and warnings with the patient as well. Informed pt to return to the office if new symptoms arise.       Jluis Luevano MD  Family Medicine Resident, PGY-3

## 2019-04-16 NOTE — PROGRESS NOTES
Chief Complaint   Patient presents with    Diarrhea     1. Have you been to the ER, urgent care clinic since your last visit? Hospitalized since your last visit? No    2. Have you seen or consulted any other health care providers outside of the [address] [place] [address] since your last visit? Include any pap smears or colon screening.  No    Swollen prostate beginning of April--    Day or two after taking medication took cipro before biopsy---Dr said that this is a side effect from the medication

## 2019-04-16 NOTE — PATIENT INSTRUCTIONS
Diarrhea: Care Instructions  Your Care Instructions    Diarrhea is loose, watery stools (bowel movements). The exact cause is often hard to find. Sometimes diarrhea is your body's way of getting rid of what caused an upset stomach. Viruses, food poisoning, and many medicines can cause diarrhea. Some people get diarrhea in response to emotional stress, anxiety, or certain foods. Almost everyone has diarrhea now and then. It usually isn't serious, and your stools will return to normal soon. The important thing to do is replace the fluids you have lost, so you can prevent dehydration. The doctor has checked you carefully, but problems can develop later. If you notice any problems or new symptoms, get medical treatment right away. Follow-up care is a key part of your treatment and safety. Be sure to make and go to all appointments, and call your doctor if you are having problems. It's also a good idea to know your test results and keep a list of the medicines you take. How can you care for yourself at home? · Watch for signs of dehydration, which means your body has lost too much water. Dehydration is a serious condition and should be treated right away. Signs of dehydration are:  ? Increasing thirst and dry eyes and mouth. ? Feeling faint or lightheaded. ? Darker urine, and a smaller amount of urine than normal.  · To prevent dehydration, drink plenty of fluids, enough so that your urine is light yellow or clear like water. Choose water and other caffeine-free clear liquids until you feel better. If you have kidney, heart, or liver disease and have to limit fluids, talk with your doctor before you increase the amount of fluids you drink. · Begin eating small amounts of mild foods the next day, if you feel like it. ? Try yogurt that has live cultures of Lactobacillus. (Check the label.)  ? Avoid spicy foods, fruits, alcohol, and caffeine until 48 hours after all symptoms are gone. ?  Avoid chewing gum that contains sorbitol. ? Avoid dairy products (except for yogurt with Lactobacillus) while you have diarrhea and for 3 days after symptoms are gone. · The doctor may recommend that you take over-the-counter medicine, such as loperamide (Imodium), if you still have diarrhea after 6 hours. Read and follow all instructions on the label. Do not use this medicine if you have bloody diarrhea, a high fever, or other signs of serious illness. Call your doctor if you think you are having a problem with your medicine. When should you call for help? Call 911 anytime you think you may need emergency care. For example, call if:    · You passed out (lost consciousness).     · Your stools are maroon or very bloody.    Call your doctor now or seek immediate medical care if:    · You are dizzy or lightheaded, or you feel like you may faint.     · Your stools are black and look like tar, or they have streaks of blood.     · You have new or worse belly pain.     · You have symptoms of dehydration, such as:  ? Dry eyes and a dry mouth. ? Passing only a little dark urine. ? Feeling thirstier than usual.     · You have a new or higher fever.    Watch closely for changes in your health, and be sure to contact your doctor if:    · Your diarrhea is getting worse.     · You see pus in the diarrhea.     · You are not getting better after 2 days (48 hours). Where can you learn more? Go to http://justine-melanie.info/. Enter A386 in the search box to learn more about \"Diarrhea: Care Instructions. \"  Current as of: September 23, 2018  Content Version: 11.9  © 0709-0874 [employer]. Care instructions adapted under license by [employer] (which disclaims liability or warranty for this information).  If you have questions about a medical condition or this instruction, always ask your healthcare professional. [place] [address] any warranty or liability for your use of this information.

## 2019-04-18 LAB — C DIFF TOX A+B STL QL IA: NORMAL

## 2019-04-26 DIAGNOSIS — R19.7 DIARRHEA, UNSPECIFIED TYPE: ICD-10-CM

## 2019-04-28 ENCOUNTER — TELEPHONE (OUTPATIENT)
Dept: FAMILY MEDICINE CLINIC | Age: 79
End: 2019-04-28

## 2019-04-28 DIAGNOSIS — A04.72 C. DIFFICILE DIARRHEA: Primary | ICD-10-CM

## 2019-04-28 LAB — C DIFF TOX A+B STL QL IA: POSITIVE

## 2019-04-28 RX ORDER — METRONIDAZOLE 500 MG/1
500 TABLET ORAL 3 TIMES DAILY
Qty: 30 TAB | Refills: 0 | Status: SHIPPED | OUTPATIENT
Start: 2019-04-28 | End: 2019-05-08

## 2019-04-28 NOTE — TELEPHONE ENCOUNTER
I called the patient at [phone] to discuss the test results. The patient was identified by 2 identifiers. Discussed results. Advised the patient to take Flagyl 500 mg TID x 10 days. If the symptoms still persist after 10 days of treatment the patient will call and will extend the treatment for the tptal of 14 days.  -The script was sent o the pharmacy.      7:45 PM  4/28/2019  Matt Stewart MD

## 2019-04-28 NOTE — PROGRESS NOTES
Stool testing is positive for C. Diff. Will treat with Flagyl 500mg Q8 hours x 10 days. Will call the patient.

## 2019-06-26 ENCOUNTER — DOCUMENTATION ONLY (OUTPATIENT)
Dept: INTERNAL MEDICINE CLINIC | Age: 79
End: 2019-06-26

## 2019-07-05 DIAGNOSIS — R35.1 NOCTURIA ASSOCIATED WITH BENIGN PROSTATIC HYPERPLASIA: ICD-10-CM

## 2019-07-05 DIAGNOSIS — N40.1 NOCTURIA ASSOCIATED WITH BENIGN PROSTATIC HYPERPLASIA: ICD-10-CM

## 2019-07-05 RX ORDER — ALFUZOSIN HYDROCHLORIDE 10 MG/1
TABLET, EXTENDED RELEASE ORAL
Qty: 90 TAB | Refills: 0 | Status: SHIPPED | OUTPATIENT
Start: 2019-07-05 | End: 2019-10-03 | Stop reason: SDUPTHER

## 2019-10-03 DIAGNOSIS — R35.1 NOCTURIA ASSOCIATED WITH BENIGN PROSTATIC HYPERPLASIA: ICD-10-CM

## 2019-10-03 DIAGNOSIS — N40.1 NOCTURIA ASSOCIATED WITH BENIGN PROSTATIC HYPERPLASIA: ICD-10-CM

## 2019-10-03 RX ORDER — ALFUZOSIN HYDROCHLORIDE 10 MG/1
TABLET, EXTENDED RELEASE ORAL
Qty: 90 TAB | Refills: 0 | Status: SHIPPED | OUTPATIENT
Start: 2019-10-03 | End: 2020-01-01

## 2020-01-01 DIAGNOSIS — N40.1 NOCTURIA ASSOCIATED WITH BENIGN PROSTATIC HYPERPLASIA: ICD-10-CM

## 2020-01-01 DIAGNOSIS — R35.1 NOCTURIA ASSOCIATED WITH BENIGN PROSTATIC HYPERPLASIA: ICD-10-CM

## 2020-01-01 RX ORDER — ALFUZOSIN HYDROCHLORIDE 10 MG/1
TABLET, EXTENDED RELEASE ORAL
Qty: 90 TAB | Refills: 0 | Status: SHIPPED | OUTPATIENT
Start: 2020-01-01 | End: 2020-03-31

## 2020-01-21 ENCOUNTER — OFFICE VISIT (OUTPATIENT)
Dept: FAMILY MEDICINE CLINIC | Age: 80
End: 2020-01-21

## 2020-01-21 VITALS
RESPIRATION RATE: 16 BRPM | BODY MASS INDEX: 33.59 KG/M2 | HEART RATE: 76 BPM | DIASTOLIC BLOOD PRESSURE: 63 MMHG | TEMPERATURE: 97.9 F | OXYGEN SATURATION: 97 % | SYSTOLIC BLOOD PRESSURE: 116 MMHG | HEIGHT: 66 IN | WEIGHT: 209 LBS

## 2020-01-21 DIAGNOSIS — S16.1XXA NECK STRAIN, INITIAL ENCOUNTER: Primary | ICD-10-CM

## 2020-01-21 RX ORDER — DICLOFENAC SODIUM 10 MG/G
GEL TOPICAL 4 TIMES DAILY
Qty: 2 G | Refills: 0 | Status: SHIPPED | OUTPATIENT
Start: 2020-01-21 | End: 2022-03-22

## 2020-01-21 NOTE — PATIENT INSTRUCTIONS
Neck Strain or Sprain: Rehab Exercises  Introduction  Here are some examples of exercises for you to try. The exercises may be suggested for a condition or for rehabilitation. Start each exercise slowly. Ease off the exercises if you start to have pain. You will be told when to start these exercises and which ones will work best for you. How to do the exercises  Neck rotation    1. Sit in a firm chair, or stand up straight. 2. Keeping your chin level, turn your head to the right, and hold for 15 to 30 seconds. 3. Turn your head to the left and hold for 15 to 30 seconds. 4. Repeat 2 to 4 times to each side. Neck stretches    1. Look straight ahead, and tip your right ear to your right shoulder. Do not let your left shoulder rise up as you tip your head to the right. 2. Hold for 15 to 30 seconds. 3. Tilt your head to the left. Do not let your right shoulder rise up as you tip your head to the left. 4. Hold for 15 to 30 seconds. 5. Repeat 2 to 4 times to each side. Forward neck flexion    1. Sit in a firm chair, or stand up straight. 2. Bend your head forward. 3. Hold for 15 to 30 seconds. 4. Repeat 2 to 4 times. Lateral (side) bend strengthening    1. With your right hand, place your first two fingers on your right temple. 2. Start to bend your head to the side while using gentle pressure from your fingers to keep your head from bending. 3. Hold for about 6 seconds. 4. Repeat 8 to 12 times. 5. Switch hands and repeat the same exercise on your left side. Forward bend strengthening    1. Place your first two fingers of either hand on your forehead. 2. Start to bend your head forward while using gentle pressure from your fingers to keep your head from bending. 3. Hold for about 6 seconds. 4. Repeat 8 to 12 times. Neutral position strengthening    1. Using one hand, place your fingertips on the back of your head at the top of your neck.   2. Start to bend your head backward while using gentle pressure from your fingers to keep your head from bending. 3. Hold for about 6 seconds. 4. Repeat 8 to 12 times. Chin tuck    1. Lie on the floor with a rolled-up towel under your neck. Your head should be touching the floor. 2. Slowly bring your chin toward your chest.  3. Hold for a count of 6, and then relax for up to 10 seconds. 4. Repeat 8 to 12 times. Follow-up care is a key part of your treatment and safety. Be sure to make and go to all appointments, and call your doctor if you are having problems. It's also a good idea to know your test results and keep a list of the medicines you take. Where can you learn more? Go to http://justine-melanie.info/. Enter M679 in the search box to learn more about \"Neck Strain or Sprain: Rehab Exercises. \"  Current as of: June 26, 2019  Content Version: 12.2  © 7057-3419 [employer]. Care instructions adapted under license by [employer] (which disclaims liability or warranty for this information). If you have questions about a medical condition or this instruction, always ask your healthcare professional. [address] any warranty or liability for your use of this information.

## 2020-01-21 NOTE — PROGRESS NOTES
Chief Complaint   Patient presents with    Neck Pain     x 2 days     1. Have you been to the ER, urgent care clinic since your last visit? Hospitalized since your last visit? No    2. Have you seen or consulted any other health care providers outside of the [address] [place] [address] since your last visit? Include any pap smears or colon screening.   No

## 2020-01-21 NOTE — PROGRESS NOTES
Progress Note    Patient: Shabana Palomo MRN: [id number]  SSN: [id number]    YOB: 1940  Age: 70 y.o. Sex: male      Chief Complaint   Patient presents with    Neck Pain     x 2 days     Subjective:   Problems addressed:  Encounter Diagnoses     ICD-10-CM ICD-9-CM   1. Neck strain, initial encounter S16. 1XXA 847.0     Pt is a 69 y/o M who presents to the clinic with neck pain. L. Sided neck pain: started 2 days after waking up from sleep. Pain is sharp, radiates up to the back of the head on the L side, worse with movement and improved with rest/decreased movement. Pt denies numbness/tingling/ weakness and loss of sensation in bilateral UE. Treatment: aleve once a day and heating pad which helped with improvement. Current and past medical information:    Current Medications after this visit[de-identified]     Current Outpatient Medications   Medication Sig    diclofenac (VOLTAREN) 1 % gel Apply  to affected area four (4) times daily.  alfuzosin SR (UROXATRAL) 10 mg SR tablet TAKE 1 TABLET BY MOUTH DAILY    finasteride (PROSCAR) 5 mg tablet     rosuvastatin (CRESTOR) 5 mg tablet Take 1 Tab by mouth nightly.  ascorbic acid, vitamin C, (VITAMIN C) 250 mg tablet Take  by mouth.  varicella-zoster recombinant, PF, (SHINGRIX, PF,) 50 mcg/0.5 mL susr injection 0.5mL by IntraMUSCular route once now and then repeat in 2-6 months    multivitamin (ONE A DAY) tablet Take 1 Tab by mouth daily.  levothyroxine (SYNTHROID) 50 mcg tablet TAKE 1 TABLET BY MOUTH EVERY MORNING AT 7AM     No current facility-administered medications for this visit.         Patient Active Problem List    Diagnosis Date Noted    C. difficile diarrhea 04/28/2019    Elevated PSA 02/22/2018    Osteoarthritis 02/21/2018    H/O colonoscopy 02/07/2017    Seizure ([address] 02/24/2015    Meningioma ([facility] [address] 02/24/2015    RACHEL (obstructive sleep apnea) 02/24/2015    Nocturia associated with benign prostatic hyperplasia 12/23/2014    Hypothyroidism 12/23/2014       Past Medical History:   Diagnosis Date    Seizures ([address]     Sleep apnea 2008       Allergies   Allergen Reactions    Bactrim [Sulfamethoprim Ds] Seizures    Sulfa (Sulfonamide Antibiotics) Other (comments)     Not sure what happens was taking during time of seizure and other brothers are allergic. Past Surgical History:   Procedure Laterality Date    KNEE ARTHROSCP HARV      right        Social History     Socioeconomic History    Marital status:      Spouse name: Not on file    Number of children: Not on file    Years of education: Not on file    Highest education level: Not on file   Tobacco Use    Smoking status: Former Smoker    Smokeless tobacco: Never Used    Tobacco comment: Quit 40 years ago   Substance and Sexual Activity    Alcohol use: No    Drug use: No    Sexual activity: Yes     Partners: Female     Review of Systems   Constitutional: Negative for chills and fever. Respiratory: Negative for cough and shortness of breath. Cardiovascular: Negative for chest pain and palpitations. Gastrointestinal: Negative for abdominal pain, constipation, diarrhea, nausea and vomiting. Musculoskeletal: Positive for myalgias and neck pain. Neurological: Negative for dizziness, tingling, tremors, sensory change, focal weakness, seizures, weakness and headaches. Objective:     Vitals:    01/21/20 0926   BP: 116/63   Pulse: 76   Resp: 16   Temp: 97.9 °F (36.6 °C)   TempSrc: Oral   SpO2: 97%   Weight: 209 lb (94.8 kg)   Height: 5' 6\" (1.676 m)      Body mass index is 33.73 kg/m². Physical Exam  Constitutional:       General: He is not in acute distress. Appearance: Normal appearance. He is not ill-appearing, toxic-appearing or diaphoretic. HENT:      Head: Normocephalic and atraumatic. Cardiovascular:      Rate and Rhythm: Normal rate and regular rhythm.    Pulmonary:      Effort: Pulmonary effort is normal.      Breath sounds: Normal breath sounds. Neurological:      Mental Status: He is alert. MSK:    Posture: Normal   Deformity: None    ROM - Cervical spine:     Flexion: Normal     Extension: limited due to pain     Lateral bending: limited due to pain. Upper Ext: FROM bilaterally       Palpation:    C1 - T1 tenderness: None    Trapezious tenderness: Present      Strength (0-5/5):    :   Left: 5/5  Right: 5/5    Wrist Extension:  Left: 5/5  Right: 5/5    Wrist Flexion:  Left: 5/5  Right: 5/5    Elbow Flextion: Left: 5/5  Right: 5/5    Elbow Extension:  Left: 5/5  Right: 5/5    Shoulder Flexion:  Left: 5/5  Right: 5/5    Shoulder Abduction:  Left: 5/5  Right: 5/5    Shouder Ext Rotation: Left: 5/5  Right: 5/5    Shoulder Int Rotation: Left: 5/5  Right: 5/5       Sensation: Intact, no deficits in the upper ext bilaterally. Special test:    Spurlings: Left: Negative  Right: Negative    Health Maintenance Due   Topic Date Due    Shingrix Vaccine Age 49> (1 of 2) 11/28/1992    GLAUCOMA SCREENING Q2Y  02/07/2019    Influenza Age 5 to Adult  08/01/2019     Assessment and orders:     Encounter Diagnoses     ICD-10-CM ICD-9-CM   1. Neck strain, initial encounter S16. 1XXA 847.0     1. Neck strain: No radicular symptoms or concerns on exam.   - Advised heat/ice on and off q15 min 3-4x a day. - home exercises  - diclofenac (VOLTAREN) 1 % gel; Apply  to affected area four (4) times daily.  - Advised Pt that flexeril is on beers list and not recommended for his age. - f/u if pain does not improve in 2 weeks. Plan of care:  Discussed diagnoses in detail with patient. Medication risks/benefits/side effects discussed with patient. All of the patient's questions were addressed. The patient understands and agrees with our plan of care. The patient knows to call back if they are unsure of or forget any changes we discussed today or if the symptoms change.      The patient received an After-Visit Summary which contains VS, orders, medication list and allergy list. This can be used as a \"mini-medical record\" should they have to seek medical care while out of town.       Signed By: Patrica Caldwell MD     January 21, 2020      Pt discussed with Dr. Nabil Alexandre.

## 2020-01-22 NOTE — PROGRESS NOTES
[address] Medicine Residency Attending Addendum:  Patient encounter was discussed on the day of the encounter with Tad House MD, performing the key elements of the service. I discussed the findings, assessment and plan with the resident and agree with the resident's findings and plan as documented in the resident's note.       Rosibel Desir MD, CAQSM, RMSK

## 2020-05-27 ENCOUNTER — APPOINTMENT (OUTPATIENT)
Dept: GENERAL RADIOLOGY | Age: 80
End: 2020-05-27
Attending: EMERGENCY MEDICINE
Payer: MEDICARE

## 2020-05-27 ENCOUNTER — HOSPITAL ENCOUNTER (EMERGENCY)
Age: 80
Discharge: HOME | End: 2020-05-27
Attending: EMERGENCY MEDICINE | Admitting: EMERGENCY MEDICINE
Payer: MEDICARE

## 2020-05-27 VITALS
WEIGHT: 196 LBS | HEIGHT: 68 IN | BODY MASS INDEX: 29.7 KG/M2 | HEART RATE: 63 BPM | DIASTOLIC BLOOD PRESSURE: 83 MMHG | TEMPERATURE: 97.8 F | RESPIRATION RATE: 13 BRPM | SYSTOLIC BLOOD PRESSURE: 137 MMHG | OXYGEN SATURATION: 97 %

## 2020-05-27 DIAGNOSIS — R55 NEAR SYNCOPE: Primary | ICD-10-CM

## 2020-05-27 DIAGNOSIS — M11.20 PSEUDOGOUT: ICD-10-CM

## 2020-05-27 LAB
ANION GAP SERPL CALC-SCNC: 5 MMOL/L (ref 5–15)
BASOPHILS # BLD: 0 K/UL (ref 0–0.1)
BASOPHILS NFR BLD: 0 % (ref 0–1)
BUN SERPL-MCNC: 21 MG/DL (ref 6–20)
BUN/CREAT SERPL: 19 (ref 12–20)
CALCIUM SERPL-MCNC: 9.2 MG/DL (ref 8.5–10.1)
CHLORIDE SERPL-SCNC: 108 MMOL/L (ref 97–108)
CO2 SERPL-SCNC: 26 MMOL/L (ref 21–32)
COMMENT, HOLDF: NORMAL
CREAT SERPL-MCNC: 1.12 MG/DL (ref 0.7–1.3)
DIFFERENTIAL METHOD BLD: ABNORMAL
EOSINOPHIL # BLD: 0.2 K/UL (ref 0–0.4)
EOSINOPHIL NFR BLD: 2 % (ref 0–7)
ERYTHROCYTE [DISTWIDTH] IN BLOOD BY AUTOMATED COUNT: 13.4 % (ref 11.5–14.5)
GLUCOSE BLD STRIP.AUTO-MCNC: 117 MG/DL (ref 65–100)
GLUCOSE SERPL-MCNC: 121 MG/DL (ref 65–100)
HCT VFR BLD AUTO: 43.6 % (ref 36.6–50.3)
HGB BLD-MCNC: 14.5 G/DL (ref 12.1–17)
IMM GRANULOCYTES # BLD AUTO: 0 K/UL (ref 0–0.04)
IMM GRANULOCYTES NFR BLD AUTO: 0 % (ref 0–0.5)
LYMPHOCYTES # BLD: 0.8 K/UL (ref 0.8–3.5)
LYMPHOCYTES NFR BLD: 8 % (ref 12–49)
MCH RBC QN AUTO: 29.4 PG (ref 26–34)
MCHC RBC AUTO-ENTMCNC: 33.3 G/DL (ref 30–36.5)
MCV RBC AUTO: 88.4 FL (ref 80–99)
MONOCYTES # BLD: 0.6 K/UL (ref 0–1)
MONOCYTES NFR BLD: 6 % (ref 5–13)
NEUTS SEG # BLD: 8.2 K/UL (ref 1.8–8)
NEUTS SEG NFR BLD: 84 % (ref 32–75)
NRBC # BLD: 0 K/UL (ref 0–0.01)
NRBC BLD-RTO: 0 PER 100 WBC
PLATELET # BLD AUTO: 203 K/UL (ref 150–400)
PMV BLD AUTO: 10.1 FL (ref 8.9–12.9)
POTASSIUM SERPL-SCNC: 4 MMOL/L (ref 3.5–5.1)
RBC # BLD AUTO: 4.93 M/UL (ref 4.1–5.7)
RBC MORPH BLD: ABNORMAL
SAMPLES BEING HELD,HOLD: NORMAL
SERVICE CMNT-IMP: ABNORMAL
SODIUM SERPL-SCNC: 139 MMOL/L (ref 136–145)
TROPONIN I SERPL-MCNC: <0.05 NG/ML
WBC # BLD AUTO: 9.8 K/UL (ref 4.1–11.1)

## 2020-05-27 PROCEDURE — 71046 X-RAY EXAM CHEST 2 VIEWS: CPT

## 2020-05-27 PROCEDURE — 85025 COMPLETE CBC W/AUTO DIFF WBC: CPT

## 2020-05-27 PROCEDURE — 74011250636 HC RX REV CODE- 250/636: Performed by: EMERGENCY MEDICINE

## 2020-05-27 PROCEDURE — 99285 EMERGENCY DEPT VISIT HI MDM: CPT

## 2020-05-27 PROCEDURE — 36415 COLL VENOUS BLD VENIPUNCTURE: CPT

## 2020-05-27 PROCEDURE — 93005 ELECTROCARDIOGRAM TRACING: CPT

## 2020-05-27 PROCEDURE — 82962 GLUCOSE BLOOD TEST: CPT

## 2020-05-27 PROCEDURE — 84484 ASSAY OF TROPONIN QUANT: CPT

## 2020-05-27 PROCEDURE — 80048 BASIC METABOLIC PNL TOTAL CA: CPT

## 2020-05-27 PROCEDURE — 96360 HYDRATION IV INFUSION INIT: CPT

## 2020-05-27 PROCEDURE — 73110 X-RAY EXAM OF WRIST: CPT

## 2020-05-27 RX ORDER — PREDNISONE 50 MG/1
50 TABLET ORAL DAILY
Qty: 5 TAB | Refills: 0 | Status: SHIPPED | OUTPATIENT
Start: 2020-05-27 | End: 2020-06-01

## 2020-05-27 RX ADMIN — SODIUM CHLORIDE 1000 ML: 900 INJECTION, SOLUTION INTRAVENOUS at 12:24

## 2020-05-27 NOTE — ED TRIAGE NOTES
Pt arrives via EMS with complaints of SOB, sweating, and generalized weakness. Pt states \"the feeling came out of no where while I was sitting at my desk\". Pt says he feels better now, but still feeling weak. Pt denies NVD and chest pain. No respiratory hx. Pt also states the last time he felt like this he was dehydrated.  Pt has new swelling on his left wrist.

## 2020-05-27 NOTE — ED PROVIDER NOTES
Date of Service:  5/27/2020    Patient:  Bo Jones    Chief Complaint:  Shortness of Breath and Dizziness       HPI:  Bo Jones is a 70 y.o.  male who presents for evaluation of shortness of breath, dizziness and lightheadedness. Patient states that he woke up feeling fine. He was sitting at his desk at work when he had the sudden onset of feeling as though he may pass out. With this he felt somewhat short of breath. No chest pain abdominal pain no nausea vomiting. No desire to go to the bathroom. He felt hot and flushed as well as fatigue but had no diaphoresis. He was able to ambulate into the next room at which point he states that he felt like he may fall over. He sat down. The symptoms gradually resolved. At present, patient states that his symptoms have totally resolved but he still feels a little fatigued. Patient had a similar incident to this in the past that was attributed to dehydration. Otherwise patient denies any other acute complaints or modifying factors. Past Medical History:   Diagnosis Date    Seizures ([facility] [address]     Sleep apnea 2008       Past Surgical History:   Procedure Laterality Date    KNEE ARTHROSCP HARV      right          Family History:   Problem Relation Age of Onset    Heart Disease Mother     Heart Disease Father        Social History     Socioeconomic History    Marital status:      Spouse name: Not on file    Number of children: Not on file    Years of education: Not on file    Highest education level: Not on file   Occupational History    Not on file   Social Needs    Financial resource strain: Not on file    Food insecurity     Worry: Not on file     Inability: Not on file    Transportation needs     Medical: Not on file     Non-medical: Not on file   Tobacco Use    Smoking status: Former Smoker    Smokeless tobacco: Never Used    Tobacco comment: Quit 40 years ago   Substance and Sexual Activity    Alcohol use: No    Drug use:  No  Sexual activity: Yes     Partners: Female   Lifestyle    Physical activity     Days per week: Not on file     Minutes per session: Not on file    Stress: Not on file   Relationships    Social connections     Talks on phone: Not on file     Gets together: Not on file     Attends Jewish service: Not on file     Active member of club or organization: Not on file     Attends meetings of clubs or organizations: Not on file     Relationship status: Not on file    Intimate partner violence     Fear of current or ex partner: Not on file     Emotionally abused: Not on file     Physically abused: Not on file     Forced sexual activity: Not on file   Other Topics Concern    Not on file   Social History Narrative    Not on file         ALLERGIES: Bactrim [sulfamethoprim ds] and Sulfa (sulfonamide antibiotics)    Review of Systems   Constitutional: Positive for fatigue. Negative for chills, diaphoresis and fever. HENT: Negative for hearing loss. Eyes: Negative for visual disturbance. Respiratory: Positive for shortness of breath. Cardiovascular: Negative for chest pain. Gastrointestinal: Negative for abdominal pain, diarrhea, nausea and vomiting. Genitourinary: Negative for flank pain. Musculoskeletal: Negative for back pain. Skin: Negative for rash. Neurological: Positive for dizziness and light-headedness. Negative for syncope and weakness. Psychiatric/Behavioral: Negative for confusion. Vitals:    05/27/20 1152   BP: 122/82   Pulse: 62   Resp: 18   Temp: 97.8 °F (36.6 °C)   SpO2: 94%   Weight: 88.9 kg (196 lb)   Height: 5' 8\" (1.727 m)            Physical Exam  Constitutional:       General: He is not in acute distress. Appearance: He is well-developed. He is not ill-appearing, toxic-appearing or diaphoretic. HENT:      Head: Normocephalic and atraumatic. Mouth/Throat:      Mouth: Mucous membranes are dry. Pharynx: Oropharynx is clear.    Eyes:      Conjunctiva/sclera: Conjunctivae normal.      Pupils: Pupils are equal, round, and reactive to light. Neck:      Musculoskeletal: Neck supple. Cardiovascular:      Rate and Rhythm: Normal rate and regular rhythm. Heart sounds: No murmur. Pulmonary:      Effort: Pulmonary effort is normal. No respiratory distress. Breath sounds: Normal breath sounds. No decreased breath sounds, wheezing, rhonchi or rales. Chest:      Chest wall: No mass. Abdominal:      General: There is no distension. Palpations: Abdomen is soft. Tenderness: There is no abdominal tenderness. Musculoskeletal:         General: No tenderness or deformity. Comments: Left wrist minimally swollen. Tender to palpation. No overlying color change. Minimal warmth. Skin:     General: Skin is warm and dry. Capillary Refill: Capillary refill takes less than 2 seconds. Findings: No rash. Neurological:      Mental Status: He is alert and oriented to person, place, and time. Cranial Nerves: Cranial nerves are intact. Motor: Motor function is intact. Coordination: Coordination is intact. Psychiatric:         Behavior: Behavior normal.         Thought Content:  Thought content normal.         Judgment: Judgment normal.          MDM  Number of Diagnoses or Management Options  Near syncope:   Pseudogout:     ED Course as of May 27 1555   Wed May 27, 2020   1209 EKG 1155  NSR 62  Left axis  Normal intervals  No acute changes    [GG]      ED Course User Index  [GG] [place] Cords, DO     VITAL SIGNS:  Patient Vitals for the past 4 hrs:   Pulse Resp BP SpO2   05/27/20 1330 63 13 137/83    05/27/20 1315 69 22 (!) 139/95 97 %   05/27/20 1300 68 20 140/86 98 %   05/27/20 1221    97 %   05/27/20 1215 73 13 135/82 97 %         LABS:  Recent Results (from the past 6 hour(s))   CBC WITH AUTOMATED DIFF    Collection Time: 05/27/20 12:01 PM   Result Value Ref Range    WBC 9.8 4.1 - 11.1 K/uL    RBC 4.93 4.10 - 5.70 M/uL    HGB 14.5 12.1 - 17.0 g/dL    HCT 43.6 36.6 - 50.3 %    MCV 88.4 80.0 - 99.0 FL    MCH 29.4 26.0 - 34.0 PG    MCHC 33.3 30.0 - 36.5 g/dL    RDW 13.4 11.5 - 14.5 %    PLATELET 292 829 - 463 K/uL    MPV 10.1 8.9 - 12.9 FL    NRBC 0.0 0  WBC    ABSOLUTE NRBC 0.00 0.00 - 0.01 K/uL    NEUTROPHILS 84 (H) 32 - 75 %    LYMPHOCYTES 8 (L) 12 - 49 %    MONOCYTES 6 5 - 13 %    EOSINOPHILS 2 0 - 7 %    BASOPHILS 0 0 - 1 %    IMMATURE GRANULOCYTES 0 0.0 - 0.5 %    ABS. NEUTROPHILS 8.2 (H) 1.8 - 8.0 K/UL    ABS. LYMPHOCYTES 0.8 0.8 - 3.5 K/UL    ABS. MONOCYTES 0.6 0.0 - 1.0 K/UL    ABS. EOSINOPHILS 0.2 0.0 - 0.4 K/UL    ABS. BASOPHILS 0.0 0.0 - 0.1 K/UL    ABS. IMM. GRANS. 0.0 0.00 - 0.04 K/UL    DF SMEAR SCANNED      RBC COMMENTS NORMOCYTIC, NORMOCHROMIC     METABOLIC PANEL, BASIC    Collection Time: 05/27/20 12:01 PM   Result Value Ref Range    Sodium 139 136 - 145 mmol/L    Potassium 4.0 3.5 - 5.1 mmol/L    Chloride 108 97 - 108 mmol/L    CO2 26 21 - 32 mmol/L    Anion gap 5 5 - 15 mmol/L    Glucose 121 (H) 65 - 100 mg/dL    BUN 21 (H) 6 - 20 MG/DL    Creatinine 1.12 0.70 - 1.30 MG/DL    BUN/Creatinine ratio 19 12 - 20      GFR est AA >60 >60 ml/min/1.73m2    GFR est non-AA >60 >60 ml/min/1.73m2    Calcium 9.2 8.5 - 10.1 MG/DL   TROPONIN I    Collection Time: 05/27/20 12:01 PM   Result Value Ref Range    Troponin-I, Qt. <0.05 <0.05 ng/mL   SAMPLES BEING HELD    Collection Time: 05/27/20 12:01 PM   Result Value Ref Range    SAMPLES BEING HELD 1RED,1BL,1SST     COMMENT        Add-on orders for these samples will be processed based on acceptable specimen integrity and analyte stability, which may vary by analyte. GLUCOSE, POC    Collection Time: 05/27/20 12:03 PM   Result Value Ref Range    Glucose (POC) 117 (H) 65 - 100 mg/dL    Performed by Chele Narayan         IMAGING:  XR CHEST PA LAT   Final Result   Impression: No acute process.          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            Medications During Visit:  Medications   sodium chloride 0.9 % bolus infusion 1,000 mL (0 mL IntraVENous IV Completed 5/27/20 1316)         DECISION MAKING:  Alex Miller is a 70 y.o. male who comes in as above. Patient's left wrist most likely has pseudogout. For this I will provide him with prednisone. From a near syncopal episode, this was a short-lived episode that has totally resolved. Patient has no complete complaints now. Because of this episode is unknown but as he appears well and has no signs of cardiac injury off discharged home with outpatient follow-up and strict return precautions      IMPRESSION:  1. Near syncope    2. Pseudogout        DISPOSITION:  Discharged      Discharge Medication List as of 5/27/2020  1:29 PM      START taking these medications    Details   predniSONE (DELTASONE) 50 mg tablet Take 1 Tab by mouth daily for 5 days. , Normal, Disp-5 Tab, R-0         CONTINUE these medications which have NOT CHANGED    Details   alfuzosin SR (UROXATRAL) 10 mg SR tablet TAKE 1 TABLET BY MOUTH DAILY, Normal, Disp-30 Tab, R-0Need appointment for refill      diclofenac (VOLTAREN) 1 % gel Apply  to affected area four (4) times daily. , Normal, Disp-2 g, R-0      finasteride (PROSCAR) 5 mg tablet Historical Med      varicella-zoster recombinant, PF, (SHINGRIX, PF,) 50 mcg/0.5 mL susr injection 0.5mL by IntraMUSCular route once now and then repeat in 2-6 months, Print, Disp-0.5 mL, R-1      rosuvastatin (CRESTOR) 5 mg tablet Take 1 Tab by mouth nightly., Normal, Disp-30 Tab, R-3      ascorbic acid, vitamin C, (VITAMIN C) 250 mg tablet Take  by mouth., Historical Med      multivitamin (ONE A DAY) tablet Take 1 Tab by mouth daily. , Historical Med      levothyroxine (SYNTHROID) 50 mcg tablet TAKE 1 TABLET BY MOUTH EVERY MORNING AT 7AM, Normal, Disp-30 Tab, R-5              Follow-up Information     Follow up With Specialties Details Why Contact Sree Mazariegos Arsenio Messina,  Family Practice Schedule an appointment as soon as possible for a visit   [address] [place] [address] Luc Aparicio [address]  [phone]              The patient is asked to follow-up with their primary care provider in the next several days. They are to call tomorrow for an appointment. The patient is asked to return promptly for any increased concerns or worsening of symptoms. They can return to this emergency department or any other emergency department.     Procedures

## 2020-05-28 ENCOUNTER — PATIENT OUTREACH (OUTPATIENT)
Dept: INTERNAL MEDICINE CLINIC | Age: 80
End: 2020-05-28

## 2020-05-28 LAB
ATRIAL RATE: 62 BPM
CALCULATED P AXIS, ECG09: 8 DEGREES
CALCULATED R AXIS, ECG10: -34 DEGREES
CALCULATED T AXIS, ECG11: 11 DEGREES
DIAGNOSIS, 93000: NORMAL
P-R INTERVAL, ECG05: 166 MS
Q-T INTERVAL, ECG07: 408 MS
QRS DURATION, ECG06: 90 MS
QTC CALCULATION (BEZET), ECG08: 414 MS
VENTRICULAR RATE, ECG03: 62 BPM

## 2020-05-28 NOTE — PROGRESS NOTES
Patient contacted regarding recent discharge and COVID-19 risk. Did not discuss COVID-19 related testing which was not done at this time. Ambulatory Care Manager contacted the patient by telephone to perform post discharge assessment. Verified name and  with patient as identifiers. Patient has following risk factors of: ED visit 20. ACM reviewed discharge instructions, medical action plan and red flags related to discharge diagnosis. Reviewed and educated them on any new and changed medications related to discharge diagnosis. Patient reports feeling better today. Started taking medication prescribed from ED for his hand and he feels it is working. Encouraged patient to FU with PCP. Patient will consider. Patient verified Decision Maker as correctly listed in chart: Donna Mena (spouse) [phone]. Education provided regarding infection prevention, and signs and symptoms of COVID-19 and when to seek medical attention with patient who verbalized understanding. Discussed exposure protocols and quarantine from [address] you at higher risk for severe illness  and given an opportunity for questions and concerns. The patient agrees to contact the COVID-19 hotline [phone] or PCP office for questions related to their healthcare. ACM provided contact information for future reference. From CDC: Are you at higher risk for severe illness?  Wash your hands often.  Avoid close contact (6 feet, which is about two arm lengths) with people who are sick.  Put distance between yourself and other people if COVID-19 is spreading in your community.  Clean and disinfect frequently touched surfaces.  Avoid all cruise travel and non-essential air travel.  Call your healthcare professional if you have concerns about COVID-19 and your underlying condition or if you are sick.     For more information on steps you can take to protect yourself, see CDC's How to Protect Yourself Patient/family/caregiver given information for [employer] and agrees to enroll no    Plan for follow-up call in 7-14 days based on severity of symptoms and risk factors.     Praveen Quevedo RN  Ambulatory Care Manager

## 2020-05-30 DIAGNOSIS — N40.1 NOCTURIA ASSOCIATED WITH BENIGN PROSTATIC HYPERPLASIA: ICD-10-CM

## 2020-05-30 DIAGNOSIS — R35.1 NOCTURIA ASSOCIATED WITH BENIGN PROSTATIC HYPERPLASIA: ICD-10-CM

## 2020-06-01 RX ORDER — ALFUZOSIN HYDROCHLORIDE 10 MG/1
TABLET, EXTENDED RELEASE ORAL
Qty: 30 TAB | Refills: 0 | Status: SHIPPED | OUTPATIENT
Start: 2020-06-01 | End: 2020-06-29

## 2020-06-13 ENCOUNTER — PATIENT OUTREACH (OUTPATIENT)
Dept: INTERNAL MEDICINE CLINIC | Age: 80
End: 2020-06-13

## 2020-06-13 NOTE — PROGRESS NOTES
Patient resolved from Transition of Care episode on 6/13/20  Discussed COVID-19 related testing which was not done at this time. Patient/family has been provided the following resources and education related to COVID-19:                         Signs, symptoms and red flags related to COVID-19            CDC exposure and quarantine guidelines            [facility] exposure contact - [phone]            Contact for their local Department of Health                 Patient currently reports that the following symptoms have improved:  unable to asses due to patient not answering phone. M. Radha Kimball No further outreach scheduled with this ACM. Episode of Care resolved. Patient has this ACM contact information if future needs arise.   Luis Felipe Freeman, RN  Ambulatory Care Manager

## 2020-06-29 DIAGNOSIS — N40.1 NOCTURIA ASSOCIATED WITH BENIGN PROSTATIC HYPERPLASIA: ICD-10-CM

## 2020-06-29 DIAGNOSIS — R35.1 NOCTURIA ASSOCIATED WITH BENIGN PROSTATIC HYPERPLASIA: ICD-10-CM

## 2020-06-29 RX ORDER — ALFUZOSIN HYDROCHLORIDE 10 MG/1
TABLET, EXTENDED RELEASE ORAL
Qty: 30 TAB | Refills: 0 | Status: SHIPPED | OUTPATIENT
Start: 2020-06-29 | End: 2020-07-29

## 2020-07-29 DIAGNOSIS — R35.1 NOCTURIA ASSOCIATED WITH BENIGN PROSTATIC HYPERPLASIA: ICD-10-CM

## 2020-07-29 DIAGNOSIS — N40.1 NOCTURIA ASSOCIATED WITH BENIGN PROSTATIC HYPERPLASIA: ICD-10-CM

## 2020-07-29 RX ORDER — ALFUZOSIN HYDROCHLORIDE 10 MG/1
TABLET, EXTENDED RELEASE ORAL
Qty: 30 TAB | Refills: 0 | Status: SHIPPED | OUTPATIENT
Start: 2020-07-29 | End: 2020-08-28

## 2020-08-28 DIAGNOSIS — R35.1 NOCTURIA ASSOCIATED WITH BENIGN PROSTATIC HYPERPLASIA: ICD-10-CM

## 2020-08-28 DIAGNOSIS — N40.1 NOCTURIA ASSOCIATED WITH BENIGN PROSTATIC HYPERPLASIA: ICD-10-CM

## 2020-08-28 RX ORDER — ALFUZOSIN HYDROCHLORIDE 10 MG/1
TABLET, EXTENDED RELEASE ORAL
Qty: 30 TAB | Refills: 0 | Status: SHIPPED | OUTPATIENT
Start: 2020-08-28 | End: 2020-10-29

## 2020-10-27 DIAGNOSIS — N40.1 NOCTURIA ASSOCIATED WITH BENIGN PROSTATIC HYPERPLASIA: ICD-10-CM

## 2020-10-27 DIAGNOSIS — R35.1 NOCTURIA ASSOCIATED WITH BENIGN PROSTATIC HYPERPLASIA: ICD-10-CM

## 2020-10-29 RX ORDER — ALFUZOSIN HYDROCHLORIDE 10 MG/1
TABLET, EXTENDED RELEASE ORAL
Qty: 30 TAB | Refills: 0 | Status: SHIPPED | OUTPATIENT
Start: 2020-10-29 | End: 2020-11-27

## 2020-11-26 DIAGNOSIS — R35.1 NOCTURIA ASSOCIATED WITH BENIGN PROSTATIC HYPERPLASIA: ICD-10-CM

## 2020-11-26 DIAGNOSIS — N40.1 NOCTURIA ASSOCIATED WITH BENIGN PROSTATIC HYPERPLASIA: ICD-10-CM

## 2020-11-27 RX ORDER — ALFUZOSIN HYDROCHLORIDE 10 MG/1
TABLET, EXTENDED RELEASE ORAL
Qty: 30 TAB | Refills: 0 | Status: SHIPPED | OUTPATIENT
Start: 2020-11-27 | End: 2020-12-30

## 2020-12-28 ENCOUNTER — OFFICE VISIT (OUTPATIENT)
Dept: URGENT CARE | Age: 80
End: 2020-12-28
Payer: MEDICARE

## 2020-12-28 VITALS — RESPIRATION RATE: 17 BRPM | HEART RATE: 63 BPM | TEMPERATURE: 98.7 F | OXYGEN SATURATION: 97 %

## 2020-12-28 DIAGNOSIS — Z20.822 EXPOSURE TO COVID-19 VIRUS: Primary | ICD-10-CM

## 2020-12-28 PROCEDURE — 99202 OFFICE O/P NEW SF 15 MIN: CPT | Performed by: FAMILY MEDICINE

## 2020-12-28 PROCEDURE — G8432 DEP SCR NOT DOC, RNG: HCPCS | Performed by: FAMILY MEDICINE

## 2020-12-28 PROCEDURE — G8536 NO DOC ELDER MAL SCRN: HCPCS | Performed by: FAMILY MEDICINE

## 2020-12-28 PROCEDURE — G8417 CALC BMI ABV UP PARAM F/U: HCPCS | Performed by: FAMILY MEDICINE

## 2020-12-28 PROCEDURE — 1101F PT FALLS ASSESS-DOCD LE1/YR: CPT | Performed by: FAMILY MEDICINE

## 2020-12-28 PROCEDURE — G8427 DOCREV CUR MEDS BY ELIG CLIN: HCPCS | Performed by: FAMILY MEDICINE

## 2020-12-28 NOTE — PROGRESS NOTES
This patient was seen at [address] [place] [address] Urgent Care while in their vehicle due to COVID-19 pandemic with PPE and focused examination in order to decrease community viral transmission. The patient/guardian gave verbal consent to treat. The history is provided by the patient. Fatigue  This is a new problem. The current episode started 2 days ago. The problem occurs constantly. The problem has not changed since onset. Pertinent negatives include no chest pain, no abdominal pain, no headaches and no shortness of breath. Nothing aggravates the symptoms. Nothing relieves the symptoms. He has tried nothing for the symptoms.         Past Medical History:   Diagnosis Date    Seizures ([facility] [address]     Sleep apnea 2008        Past Surgical History:   Procedure Laterality Date    HX ORTHOPAEDIC      KNEE ARTHROSCP HARV      right          Family History   Problem Relation Age of Onset    Heart Disease Mother     Heart Disease Father         Social History     Socioeconomic History    Marital status:      Spouse name: Not on file    Number of children: Not on file    Years of education: Not on file    Highest education level: Not on file   Occupational History    Not on file   Social Needs    Financial resource strain: Not on file    Food insecurity     Worry: Not on file     Inability: Not on file    Transportation needs     Medical: Not on file     Non-medical: Not on file   Tobacco Use    Smoking status: Former Smoker    Smokeless tobacco: Never Used    Tobacco comment: Quit 40 years ago   Substance and Sexual Activity    Alcohol use: No    Drug use: No    Sexual activity: Yes     Partners: Female   Lifestyle    Physical activity     Days per week: Not on file     Minutes per session: Not on file    Stress: Not on file   Relationships    Social connections     Talks on phone: Not on file     Gets together: Not on file     Attends Bahai service: Not on file     Active member of club or organization: Not on file     Attends meetings of clubs or organizations: Not on file     Relationship status: Not on file    Intimate partner violence     Fear of current or ex partner: Not on file     Emotionally abused: Not on file     Physically abused: Not on file     Forced sexual activity: Not on file   Other Topics Concern    Not on file   Social History Narrative    Not on file                ALLERGIES: Bactrim [sulfamethoprim ds] and Sulfa (sulfonamide antibiotics)    Review of Systems   Constitutional: Positive for appetite change and fatigue. Respiratory: Negative for shortness of breath. Cardiovascular: Negative for chest pain. Gastrointestinal: Negative for abdominal pain. Neurological: Negative for headaches. All other systems reviewed and are negative. Vitals:    12/28/20 1145   Pulse: 63   Resp: 17   Temp: 98.7 °F (37.1 °C)   SpO2: 97%       Physical Exam  Vitals signs and nursing note reviewed. Constitutional:       General: He is not in acute distress. Appearance: He is not ill-appearing. Pulmonary:      Effort: Pulmonary effort is normal. No respiratory distress. Breath sounds: Normal breath sounds. MDM    Procedures        ICD-10-CM ICD-9-CM    1. Exposure to COVID-19 virus  Z20.828 V01.79 NOVEL CORONAVIRUS (COVID-19)     No orders of the defined types were placed in this encounter. No results found for any visits on 12/28/20. The patients condition was discussed with the patient and they understand. The patient is to follow up with primary care doctor. If signs and symptoms become worse the pt is to go to the ER. The patient is to take medications as prescribed.

## 2020-12-30 DIAGNOSIS — R35.1 NOCTURIA ASSOCIATED WITH BENIGN PROSTATIC HYPERPLASIA: ICD-10-CM

## 2020-12-30 DIAGNOSIS — N40.1 NOCTURIA ASSOCIATED WITH BENIGN PROSTATIC HYPERPLASIA: ICD-10-CM

## 2020-12-30 LAB — SARS-COV-2, NAA: DETECTED

## 2020-12-30 RX ORDER — ALFUZOSIN HYDROCHLORIDE 10 MG/1
TABLET, EXTENDED RELEASE ORAL
Qty: 30 TAB | Refills: 0 | Status: SHIPPED | OUTPATIENT
Start: 2020-12-30 | End: 2021-01-30

## 2021-01-29 DIAGNOSIS — N40.1 NOCTURIA ASSOCIATED WITH BENIGN PROSTATIC HYPERPLASIA: ICD-10-CM

## 2021-01-29 DIAGNOSIS — R35.1 NOCTURIA ASSOCIATED WITH BENIGN PROSTATIC HYPERPLASIA: ICD-10-CM

## 2021-01-30 RX ORDER — ALFUZOSIN HYDROCHLORIDE 10 MG/1
TABLET, EXTENDED RELEASE ORAL
Qty: 30 TAB | Refills: 0 | Status: SHIPPED | OUTPATIENT
Start: 2021-01-30 | End: 2021-03-15

## 2021-03-04 DIAGNOSIS — N40.1 NOCTURIA ASSOCIATED WITH BENIGN PROSTATIC HYPERPLASIA: ICD-10-CM

## 2021-03-04 DIAGNOSIS — R35.1 NOCTURIA ASSOCIATED WITH BENIGN PROSTATIC HYPERPLASIA: ICD-10-CM

## 2021-03-09 NOTE — TELEPHONE ENCOUNTER
Left message to call office. Patient last seen 1/21/2020.      Please schedule a Medicare Wellness Exam.

## 2021-03-15 RX ORDER — ALFUZOSIN HYDROCHLORIDE 10 MG/1
TABLET, EXTENDED RELEASE ORAL
Qty: 30 TAB | Refills: 0 | Status: SHIPPED | OUTPATIENT
Start: 2021-03-15 | End: 2022-03-22

## 2021-03-19 ENCOUNTER — TRANSCRIBE ORDER (OUTPATIENT)
Dept: SCHEDULING | Age: 81
End: 2021-03-19

## 2021-03-19 DIAGNOSIS — C61 PROSTATE CANCER (HCC): Primary | ICD-10-CM

## 2021-04-01 ENCOUNTER — HOSPITAL ENCOUNTER (OUTPATIENT)
Dept: NUCLEAR MEDICINE | Age: 81
Discharge: HOME | End: 2021-04-01
Attending: UROLOGY
Payer: MEDICARE

## 2021-04-01 DIAGNOSIS — C61 PROSTATE CANCER (HCC): ICD-10-CM

## 2021-04-01 PROCEDURE — 78306 BONE IMAGING WHOLE BODY: CPT

## 2022-03-19 PROBLEM — A04.72 C. DIFFICILE DIARRHEA: Status: ACTIVE | Noted: 2019-04-28

## 2022-03-19 PROBLEM — M19.90 OSTEOARTHRITIS: Status: ACTIVE | Noted: 2018-02-21

## 2022-03-19 PROBLEM — Z98.890 H/O COLONOSCOPY: Status: ACTIVE | Noted: 2017-02-07

## 2022-03-20 PROBLEM — R97.20 ELEVATED PSA: Status: ACTIVE | Noted: 2018-02-22

## 2022-03-22 ENCOUNTER — OFFICE VISIT (OUTPATIENT)
Dept: FAMILY MEDICINE CLINIC | Age: 82
End: 2022-03-22
Payer: MEDICARE

## 2022-03-22 VITALS
RESPIRATION RATE: 18 BRPM | BODY MASS INDEX: 28.73 KG/M2 | TEMPERATURE: 97.5 F | HEIGHT: 68 IN | DIASTOLIC BLOOD PRESSURE: 70 MMHG | OXYGEN SATURATION: 98 % | SYSTOLIC BLOOD PRESSURE: 135 MMHG | HEART RATE: 59 BPM | WEIGHT: 189.6 LBS

## 2022-03-22 DIAGNOSIS — M75.41 IMPINGEMENT SYNDROME OF BOTH SHOULDERS: Primary | ICD-10-CM

## 2022-03-22 DIAGNOSIS — M25.511 BILATERAL SHOULDER PAIN, UNSPECIFIED CHRONICITY: ICD-10-CM

## 2022-03-22 DIAGNOSIS — M75.42 IMPINGEMENT SYNDROME OF BOTH SHOULDERS: Primary | ICD-10-CM

## 2022-03-22 DIAGNOSIS — M25.512 BILATERAL SHOULDER PAIN, UNSPECIFIED CHRONICITY: ICD-10-CM

## 2022-03-22 PROCEDURE — 99213 OFFICE O/P EST LOW 20 MIN: CPT | Performed by: FAMILY MEDICINE

## 2022-03-22 PROCEDURE — 1101F PT FALLS ASSESS-DOCD LE1/YR: CPT | Performed by: FAMILY MEDICINE

## 2022-03-22 PROCEDURE — G8432 DEP SCR NOT DOC, RNG: HCPCS | Performed by: FAMILY MEDICINE

## 2022-03-22 PROCEDURE — G8419 CALC BMI OUT NRM PARAM NOF/U: HCPCS | Performed by: FAMILY MEDICINE

## 2022-03-22 PROCEDURE — G8536 NO DOC ELDER MAL SCRN: HCPCS | Performed by: FAMILY MEDICINE

## 2022-03-22 PROCEDURE — G8427 DOCREV CUR MEDS BY ELIG CLIN: HCPCS | Performed by: FAMILY MEDICINE

## 2022-03-22 NOTE — PROGRESS NOTES
Chief Complaint   Patient presents with    Shoulder Pain     b/l shoulder pain       Eduard Guzman is a 80 y.o. male who presents for chronic, intermittent, b/l shoulder pain. No trauma. The R shoulder started several months ago, anterior, feels like an ache that radiates to the elbow. No numbness/tingling/weakness. The L shoulder pain is posterior, along the shoulder blade. Both shoulders feel worse w/ overhead movement and internal rotation. Ibuprofen helps. Hasn't tried PT. This is the first time he's being evaluated for this issue. PMHx:  Past Medical History:   Diagnosis Date    Seizures ([facility] [address]     Sleep apnea 2008       Meds:   Current Outpatient Medications   Medication Sig Dispense Refill    alfuzosin SR (UROXATRAL) 10 mg SR tablet TAKE 1 TABLET BY MOUTH DAILY (Patient not taking: Reported on 3/22/2022) 30 Tab 0    diclofenac (VOLTAREN) 1 % gel Apply  to affected area four (4) times daily. 2 g 0    finasteride (PROSCAR) 5 mg tablet  (Patient not taking: Reported on 3/22/2022)      varicella-zoster recombinant, PF, (SHINGRIX, PF,) 50 mcg/0.5 mL susr injection 0.5mL by IntraMUSCular route once now and then repeat in 2-6 months 0.5 mL 1    rosuvastatin (CRESTOR) 5 mg tablet Take 1 Tab by mouth nightly. 30 Tab 3    ascorbic acid, vitamin C, (VITAMIN C) 250 mg tablet Take  by mouth.  multivitamin (ONE A DAY) tablet Take 1 Tab by mouth daily. (Patient not taking: Reported on 3/22/2022)      levothyroxine (SYNTHROID) 50 mcg tablet TAKE 1 TABLET BY MOUTH EVERY MORNING AT 7AM 30 Tab 5       Allergies: Allergies   Allergen Reactions    Bactrim [Sulfamethoprim Ds] Seizures    Sulfa (Sulfonamide Antibiotics) Other (comments)     Not sure what happens was taking during time of seizure and other brothers are allergic.        Smoker:  Social History     Tobacco Use   Smoking Status Former Smoker   Smokeless Tobacco Never Used   Tobacco Comment    Quit 40 years ago       ETOH:   Social History Substance and Sexual Activity   Alcohol Use No       FH:   Family History   Problem Relation Age of Onset    Heart Disease Mother     Heart Disease Father        ROS:  General/Constitutional:   No fever  Neurological:   No numbness, tingling  Skin: No rash     Physical Exam:  Visit Vitals  /70   Pulse (!) 59   Temp 97.5 °F (36.4 °C)   Resp 18   Ht 5' 8\" (1.727 m)   Wt 189 lb 9.6 oz (86 kg)   SpO2 98%   BMI 28.83 kg/m²     Shoulder: bilaterally  Deformity: None    ROM:  Forward Flexion: Active: 180     ER (0): Active: 45     IR (0): Active: Behind the body to the level L4  Abduction: Active: 180       Palpation:  AC tenderness: None  SC tenderness: None  Clavicle tenderness: None  Biceps tenderness: None    Strength (0-5/5):  Deltoid  Anterior: 5/5  Deltoid  Posterior: 5/5  Deltoid  Mid: 5/5  Supraspinatus: 5/5  External rotation: 5/5  Internal rotation: 5/5    Rotator Cuff Exam:  Neers sign: Positive  Singer sign: Positive  Painful Arc: Positive on R  Lift-off sign / Belly Press: Negative    Biceps/Labrum/AC Exam:  Yergasons Test: Negative  Speeds Test: Negative  OGuilhremes Sign: Negative  Cross-chest adduction: Negative    Neuro/Vascular:  Pulses intact, no edema, and neurologically intact    C-Spine:  Cervical motion: FROM without pain. Cervical tenderness: None  Spurlings test: Negative    Assessment:    ICD-10-CM ICD-9-CM    1. Impingement syndrome of both shoulders  M75.41 726.2     M75.42         Plan:  1. Impingement syndrome of both shoulders  Rotator cuff tendinopathy. No XR on record but would likely show some degenerative changes. Will start w/ conservative therapy at this time. Pt wishes to do exercises at home  - HEP w/ resistance bands given  - NSAIDs PRN for pain  RTC PRN if sx fail to improve. Can consider formal PT +/- subacromial CSI.

## 2022-03-22 NOTE — PROGRESS NOTES
Pain Assessment Encounter      [facility]  Chief Complaint   Patient presents with    Shoulder Pain     b/l shoulder pain     Visit Vitals  /70   Pulse (!) 59   Temp 97.5 °F (36.4 °C)   Resp 18   Ht 5' 8\" (1.727 m)   Wt 189 lb 9.6 oz (86 kg)   SpO2 98%   BMI 28.83 kg/m²       Onset of Symptoms: chronic, has worsened over the past month  Description:RIGHT - varies between mild aching to sharp. Mostly a mild ache. Feels sharp when raising his right arm or picking up something heavy. LEFT - feels a sharp pain in his shoulder blade    Trauma Hx: no fractures or recent falls  Hx of similar symptoms: Yes  Radiation: right shoulder down to elbow  Duration:  continuous    Progression: is moderately worse  What makes it better?: uses a salve that provided temporary relief, ibuprofen  What makes it worse?:stretching    1. \"Have you been to the ER, urgent care clinic since your last visit? Hospitalized since your last visit? \" No    2. \"Have you seen or consulted any other health care providers outside of the [address] [place] [address] since your last visit? \" No     3. For patients aged 39-70: Has the patient had a colonoscopy / FIT/ Cologuard?  Yes - no Care Gap present

## 2022-09-19 ENCOUNTER — OFFICE VISIT (OUTPATIENT)
Dept: FAMILY MEDICINE CLINIC | Age: 82
End: 2022-09-19
Payer: MEDICARE

## 2022-09-19 VITALS
WEIGHT: 191.2 LBS | RESPIRATION RATE: 13 BRPM | HEART RATE: 56 BPM | HEIGHT: 68 IN | SYSTOLIC BLOOD PRESSURE: 128 MMHG | BODY MASS INDEX: 28.98 KG/M2 | DIASTOLIC BLOOD PRESSURE: 78 MMHG | OXYGEN SATURATION: 96 % | TEMPERATURE: 98.2 F

## 2022-09-19 DIAGNOSIS — C61 PROSTATE CANCER (HCC): ICD-10-CM

## 2022-09-19 DIAGNOSIS — R56.9 SEIZURE (HCC): ICD-10-CM

## 2022-09-19 DIAGNOSIS — E03.9 HYPOTHYROIDISM, UNSPECIFIED TYPE: ICD-10-CM

## 2022-09-19 DIAGNOSIS — R01.1 HEART MURMUR: Primary | ICD-10-CM

## 2022-09-19 DIAGNOSIS — Z23 ENCOUNTER FOR IMMUNIZATION: ICD-10-CM

## 2022-09-19 DIAGNOSIS — Z01.818 PREOP EXAMINATION: ICD-10-CM

## 2022-09-19 PROBLEM — N13.8 BENIGN PROSTATIC HYPERPLASIA WITH URINARY OBSTRUCTION: Status: ACTIVE | Noted: 2022-09-19

## 2022-09-19 PROBLEM — N40.1 BENIGN PROSTATIC HYPERPLASIA WITH URINARY OBSTRUCTION: Status: ACTIVE | Noted: 2022-09-19

## 2022-09-19 PROCEDURE — G8417 CALC BMI ABV UP PARAM F/U: HCPCS | Performed by: STUDENT IN AN ORGANIZED HEALTH CARE EDUCATION/TRAINING PROGRAM

## 2022-09-19 PROCEDURE — G8427 DOCREV CUR MEDS BY ELIG CLIN: HCPCS | Performed by: STUDENT IN AN ORGANIZED HEALTH CARE EDUCATION/TRAINING PROGRAM

## 2022-09-19 PROCEDURE — G8510 SCR DEP NEG, NO PLAN REQD: HCPCS | Performed by: STUDENT IN AN ORGANIZED HEALTH CARE EDUCATION/TRAINING PROGRAM

## 2022-09-19 PROCEDURE — 90686 IIV4 VACC NO PRSV 0.5 ML IM: CPT | Performed by: STUDENT IN AN ORGANIZED HEALTH CARE EDUCATION/TRAINING PROGRAM

## 2022-09-19 PROCEDURE — G8536 NO DOC ELDER MAL SCRN: HCPCS | Performed by: STUDENT IN AN ORGANIZED HEALTH CARE EDUCATION/TRAINING PROGRAM

## 2022-09-19 PROCEDURE — G0008 ADMIN INFLUENZA VIRUS VAC: HCPCS | Performed by: STUDENT IN AN ORGANIZED HEALTH CARE EDUCATION/TRAINING PROGRAM

## 2022-09-19 PROCEDURE — 99214 OFFICE O/P EST MOD 30 MIN: CPT | Performed by: STUDENT IN AN ORGANIZED HEALTH CARE EDUCATION/TRAINING PROGRAM

## 2022-09-19 PROCEDURE — 1123F ACP DISCUSS/DSCN MKR DOCD: CPT | Performed by: STUDENT IN AN ORGANIZED HEALTH CARE EDUCATION/TRAINING PROGRAM

## 2022-09-19 PROCEDURE — 1101F PT FALLS ASSESS-DOCD LE1/YR: CPT | Performed by: STUDENT IN AN ORGANIZED HEALTH CARE EDUCATION/TRAINING PROGRAM

## 2022-09-19 NOTE — PROGRESS NOTES
Chief Complaint   Patient presents with    Pre-op Exam     Patient here for pre-op exam for cataract     Note written with assistance of voice recognition software. History of Present Illness:  Miguel A Ramírez is a 80 y.o. male w/ PMHx of hypothyroidism, prostate cancer (s/p prostatectomy), RACHEL (not on CPAP), osteoarthritis, seizures who presents to clinic for pre-operative examination prior to cataract surgery. Prostate cancer: Pt reports that he had a prostatectomy 3-4 months ago. He states that he believes that his surgery was done by [facility]. Seizures: Pt had an isolated seizure of unknown etiology in the summer of 2014. He was admitted to [address] [place] [address] at the time. Evaluated by neurology, neurosurgery. MRI demonstrated unchanged meningioma/mass. No complaints currently. Pt denies chest pain, SOB, palpitations, peripheral edema, abdominal pain, headaches, numbness, or weakness. Patient states that he is able to walk up 2 flights of stairs without difficulty. Past Medical History:   Diagnosis Date    Seizures ([facility] [address]     Sleep apnea 2008           Allergies   Allergen Reactions    Bactrim [Sulfamethoprim Ds] Seizures    Sulfa (Sulfonamide Antibiotics) Other (comments)     Not sure what happens was taking during time of seizure and other brothers are allergic. Social History     Tobacco Use    Smoking status: Former    Smokeless tobacco: Never    Tobacco comments:     Quit 40 years ago   Substance Use Topics    Alcohol use: No    Drug use: No       Family History   Problem Relation Age of Onset    Heart Disease Mother     Heart Disease Father        Physical Exam:     Visit Vitals  /78 (BP 1 Location: Left upper arm, BP Patient Position: Sitting, BP Cuff Size: Adult)   Pulse (!) 56   Temp 98.2 °F (36.8 °C)   Resp 13   Ht 5' 8\" (1.727 m)   Wt 191 lb 3.2 oz (86.7 kg)   SpO2 96%   BMI 29.07 kg/m²       Physical Examination:  General: NAD  CV: Heart: Bradycardic rate.  3/6 systolic ejection murmur heard best over the RUSB. Resp: Breathing comfortably on room air. Abdomen: No tenderness to palpation. No guarding or rebound. Neuro: Awake, alert, and appropriately conversant. Assessment/Plan:    Diagnoses and all orders for this visit:    1. Heart murmur: 3/6 holosystolic ejection murmur heard best over the RUSB. No prior documentation of murmur. No prior echo. Pt is asymptomatic. Can walk up two flights of stairs without difficulty. Recommend obtaining  echocardiogram to further evaluate murmur. Given that pt is asymptomatic and is able to walk up two flights of stairs without difficulty, may proceed with cataract surgery.  -     ECHO ADULT COMPLETE; Future    2. Preop examination: Patient deemed an acceptable risk for cataract surgery. Preop clearance form filled out and faxed to ophthalmologist's office. 3. Seizure [facility]): Patient had an isolated seizure episode in the summer 2014 requiring admission to [facility]. He states the seizure was attributed to an allergy to sulfa drugs. He states that he does not currently follow with neurology. He does not take any antiseizure medications. No recent seizure activity per history. 4. Prostate cancer [facility]): Patient states that he recently had a prostatectomy performed by [facility]. 5. Hypothyroidism, unspecified type: Last TSH within normal limits in 2019. Per office records, patient last took Synthroid in 2015. Recommended rechecking thyroid function today including TSH and T4.  -     TSH 3RD GENERATION; Future  -     T4, FREE; Future    6. Encounter for immunization: Recommended flu vaccine today. Patient stated that he would like to receive the flu vaccine. Administered in the office today. Patient tolerated well. Follow-up and Dispositions    Return in about 1 month (around 10/19/2022) for Medicare wellness visit. Jerilyn Peterson expressed understanding of this plan. An AVS was printed and given to the patient.      Pt discussed with Dr. Keon Camargo (Attending Physician)    Alfa Echavarria MD  Family Medicine Resident

## 2022-09-19 NOTE — PROGRESS NOTES
Miguel A Ramírez is a 80 y.o. male    Chief Complaint   Patient presents with    Pre-op Exam     Patient here for pre-op exam for cataract       1. Have you been to the ER, urgent care clinic since your last visit? Hospitalized since your last visit? No  2. Have you seen or consulted any other health care providers outside of the [address] [place] [address] since your last visit? Include any pap smears or colon screening.  No    Visit Vitals  /78 (BP 1 Location: Left upper arm, BP Patient Position: Sitting, BP Cuff Size: Adult)   Pulse (!) 56   Temp 98.2 °F (36.8 °C)   Resp 13   Ht 5' 8\" (1.727 m)   Wt 191 lb 3.2 oz (86.7 kg)   SpO2 96%   BMI 29.07 kg/m²     3 most recent PHQ Screens 9/19/2022   Little interest or pleasure in doing things Not at all   Feeling down, depressed, irritable, or hopeless Not at all   Total Score PHQ 2 0     Health Maintenance Due   Topic Date Due    Hepatitis C Screening  Never done    Depression Screen  Never done    Shingrix Vaccine Age 49> (1 of 2) Never done    Medicare Yearly Exam  02/27/2020    COVID-19 Vaccine (3 - Booster for Pfizer series) 07/16/2021    Pneumococcal 65+ years (2 - PCV) 07/17/2021    Flu Vaccine (1) 09/01/2022

## 2022-09-21 LAB
T4 FREE SERPL-MCNC: 1 NG/DL (ref 0.8–1.5)
TSH SERPL DL<=0.05 MIU/L-ACNC: 2.42 UIU/ML (ref 0.36–3.74)

## 2022-10-04 ENCOUNTER — HOSPITAL ENCOUNTER (OUTPATIENT)
Dept: NON INVASIVE DIAGNOSTICS | Age: 82
Discharge: HOME | End: 2022-10-04
Attending: STUDENT IN AN ORGANIZED HEALTH CARE EDUCATION/TRAINING PROGRAM
Payer: MEDICARE

## 2022-10-04 VITALS
BODY MASS INDEX: 28.95 KG/M2 | DIASTOLIC BLOOD PRESSURE: 81 MMHG | HEIGHT: 68 IN | SYSTOLIC BLOOD PRESSURE: 142 MMHG | WEIGHT: 191 LBS

## 2022-10-04 DIAGNOSIS — R01.1 HEART MURMUR: ICD-10-CM

## 2022-10-04 LAB
ECHO AO ASC DIAM: 4.1 CM
ECHO AO ASCENDING AORTA INDEX: 2.05 CM/M2
ECHO AV AREA PEAK VELOCITY: 2.3 CM2
ECHO AV AREA VTI: 2.2 CM2
ECHO AV AREA/BSA PEAK VELOCITY: 1.2 CM2/M2
ECHO AV AREA/BSA VTI: 1.1 CM2/M2
ECHO AV MEAN GRADIENT: 5 MMHG
ECHO AV MEAN VELOCITY: 1 M/S
ECHO AV PEAK GRADIENT: 8 MMHG
ECHO AV PEAK VELOCITY: 1.4 M/S
ECHO AV VELOCITY RATIO: 0.57
ECHO AV VTI: 32.2 CM
ECHO LA DIAMETER INDEX: 1.95 CM/M2
ECHO LA DIAMETER: 3.9 CM
ECHO LA VOL 2C: 55 ML (ref 18–58)
ECHO LA VOL 4C: 65 ML (ref 18–58)
ECHO LA VOL BP: 61 ML (ref 18–58)
ECHO LA VOL/BSA BIPLANE: 31 ML/M2 (ref 16–34)
ECHO LA VOLUME AREA LENGTH: 65 ML
ECHO LA VOLUME INDEX A2C: 28 ML/M2 (ref 16–34)
ECHO LA VOLUME INDEX A4C: 33 ML/M2 (ref 16–34)
ECHO LA VOLUME INDEX AREA LENGTH: 33 ML/M2 (ref 16–34)
ECHO LV E' LATERAL VELOCITY: 8 CM/S
ECHO LV E' SEPTAL VELOCITY: 5 CM/S
ECHO LV FRACTIONAL SHORTENING: 26 % (ref 28–44)
ECHO LV INTERNAL DIMENSION DIASTOLE INDEX: 2.15 CM/M2
ECHO LV INTERNAL DIMENSION DIASTOLIC: 4.3 CM (ref 4.2–5.9)
ECHO LV INTERNAL DIMENSION SYSTOLIC INDEX: 1.6 CM/M2
ECHO LV INTERNAL DIMENSION SYSTOLIC: 3.2 CM
ECHO LV IVSD: 1.1 CM (ref 0.6–1)
ECHO LV MASS 2D: 162.9 G (ref 88–224)
ECHO LV MASS INDEX 2D: 81.5 G/M2 (ref 49–115)
ECHO LV POSTERIOR WALL DIASTOLIC: 1.1 CM (ref 0.6–1)
ECHO LV RELATIVE WALL THICKNESS RATIO: 0.51
ECHO LVOT AREA: 3.8 CM2
ECHO LVOT AV VTI INDEX: 0.57
ECHO LVOT DIAM: 2.2 CM
ECHO LVOT MEAN GRADIENT: 1 MMHG
ECHO LVOT PEAK GRADIENT: 3 MMHG
ECHO LVOT PEAK VELOCITY: 0.8 M/S
ECHO LVOT STROKE VOLUME INDEX: 34.6 ML/M2
ECHO LVOT SV: 69.1 ML
ECHO LVOT VTI: 18.2 CM
ECHO MV A VELOCITY: 0.69 M/S
ECHO MV E DECELERATION TIME (DT): 219.4 MS
ECHO MV E VELOCITY: 0.84 M/S
ECHO MV E/A RATIO: 1.22
ECHO MV E/E' LATERAL: 10.5
ECHO MV E/E' RATIO (AVERAGED): 13.65
ECHO MV E/E' SEPTAL: 16.8
ECHO MV REGURGITANT PEAK GRADIENT: 74 MMHG
ECHO MV REGURGITANT PEAK VELOCITY: 4.3 M/S
ECHO PULMONARY ARTERY END DIASTOLIC PRESSURE: 4 MMHG
ECHO PV MAX VELOCITY: 1 M/S
ECHO PV PEAK GRADIENT: 4 MMHG
ECHO PV REGURGITANT MAX VELOCITY: 1 M/S
ECHO RV INTERNAL DIMENSION: 4.3 CM
ECHO RV TAPSE: 2 CM (ref 1.7–?)
ECHO RVOT PEAK GRADIENT: 1 MMHG
ECHO RVOT PEAK VELOCITY: 0.5 M/S
ECHO TV REGURGITANT MAX VELOCITY: 2.11 M/S
ECHO TV REGURGITANT PEAK GRADIENT: 18 MMHG

## 2022-10-04 PROCEDURE — 93306 TTE W/DOPPLER COMPLETE: CPT

## 2022-10-04 PROCEDURE — 93306 TTE W/DOPPLER COMPLETE: CPT | Performed by: INTERNAL MEDICINE

## 2022-10-06 ENCOUNTER — TELEPHONE (OUTPATIENT)
Dept: FAMILY MEDICINE CLINIC | Age: 82
End: 2022-10-06

## 2022-10-06 DIAGNOSIS — I77.819 AORTIC DILATATION (HCC): Primary | ICD-10-CM

## 2022-10-06 NOTE — PROGRESS NOTES
Reviewed results of echocardiogram.  - Normal LVEF (55-60%). - Sclerosis of aortic valve cusp, no regurgitation, no stenosis. Valve structures normal.  - Mildly dilated ascending aorta (4.1 cm).

## 2022-10-11 ENCOUNTER — LAB ONLY (OUTPATIENT)
Dept: FAMILY MEDICINE CLINIC | Age: 82
End: 2022-10-11

## 2022-10-11 DIAGNOSIS — I77.819 AORTIC DILATATION (HCC): ICD-10-CM

## 2022-10-12 LAB
ANION GAP SERPL CALC-SCNC: 6 MMOL/L (ref 5–15)
BUN SERPL-MCNC: 22 MG/DL (ref 6–20)
BUN/CREAT SERPL: 21 (ref 12–20)
CALCIUM SERPL-MCNC: 9.1 MG/DL (ref 8.5–10.1)
CHLORIDE SERPL-SCNC: 105 MMOL/L (ref 97–108)
CO2 SERPL-SCNC: 26 MMOL/L (ref 21–32)
CREAT SERPL-MCNC: 1.06 MG/DL (ref 0.7–1.3)
GLUCOSE SERPL-MCNC: 105 MG/DL (ref 65–100)
POTASSIUM SERPL-SCNC: 4.4 MMOL/L (ref 3.5–5.1)
SODIUM SERPL-SCNC: 137 MMOL/L (ref 136–145)

## 2022-10-13 ENCOUNTER — TELEPHONE (OUTPATIENT)
Dept: FAMILY MEDICINE CLINIC | Age: 82
End: 2022-10-13

## 2022-10-13 DIAGNOSIS — I77.819 AORTIC DILATATION (HCC): Primary | ICD-10-CM

## 2022-10-13 DIAGNOSIS — R93.89 ABNORMAL FINDINGS ON DIAGNOSTIC IMAGING OF OTHER SPECIFIED BODY STRUCTURES: ICD-10-CM

## 2022-10-13 NOTE — TELEPHONE ENCOUNTER
Recommended obtaining CT chest with contrast to determine the exact diameter of the dilation of the ascending aorta to inform further management. Discussed risk of contrast-induced nephropathy. Creatinine wnl. eGFR>60. Patient stated that he would like to have the scan performed.   - Placed order for CT chest with contrast.

## 2022-10-13 NOTE — PROGRESS NOTES
Reviewed results of basic metabolic panel:  - Creatinine within normal limits, eGFR greater than 60.

## 2022-10-20 ENCOUNTER — HOSPITAL ENCOUNTER (OUTPATIENT)
Dept: CT IMAGING | Age: 82
Discharge: HOME | End: 2022-10-20
Attending: STUDENT IN AN ORGANIZED HEALTH CARE EDUCATION/TRAINING PROGRAM
Payer: MEDICARE

## 2022-10-20 DIAGNOSIS — R93.89 ABNORMAL FINDINGS ON DIAGNOSTIC IMAGING OF OTHER SPECIFIED BODY STRUCTURES: ICD-10-CM

## 2022-10-20 DIAGNOSIS — I77.819 AORTIC DILATATION (HCC): ICD-10-CM

## 2022-10-20 PROCEDURE — 71275 CT ANGIOGRAPHY CHEST: CPT

## 2022-10-20 PROCEDURE — 74011000636 HC RX REV CODE- 636: Performed by: STUDENT IN AN ORGANIZED HEALTH CARE EDUCATION/TRAINING PROGRAM

## 2022-10-20 RX ADMIN — IOPAMIDOL 100 ML: 755 INJECTION, SOLUTION INTRAVENOUS at 16:34

## 2022-10-24 ENCOUNTER — TELEPHONE (OUTPATIENT)
Dept: FAMILY MEDICINE CLINIC | Age: 82
End: 2022-10-24

## 2022-10-24 DIAGNOSIS — R93.89 ABNORMAL FINDING ON CT SCAN: Primary | ICD-10-CM

## 2022-10-24 NOTE — TELEPHONE ENCOUNTER
Called pt and discussed CTA chest results:  - Normal thoracic aorta. - Borderline enlargement of pulmonary artery, suggestive of pulmonary arterial hypertension (although echocardiogram was reassuring). - Findings suggestive of diminished right heart function (although echocardiogram was reassuring). - Reassured that CT findings suggestive of pulmonary arterial hypertension and diminished right heart function likely benign given recent reassuring echocardiogram, but recommended referral to cardiology to reconcile echocardiogram and CT results. Pt stated that he would like to see a cardiologist.  - Referral placed to cardiology.

## 2022-10-24 NOTE — PROGRESS NOTES
- Normal thoracic aorta. - Borderline enlargement of pulmonary artery, suggestive of pulmonary arterial hypertension (although echocardiogram was reassuring). - Findings suggestive of diminished right heart function (although echocardiogram was reassuring). - Recommend referral to cardiology to reconcile echocardiogram and CT results.